# Patient Record
Sex: MALE | Race: WHITE | Employment: OTHER | URBAN - METROPOLITAN AREA
[De-identification: names, ages, dates, MRNs, and addresses within clinical notes are randomized per-mention and may not be internally consistent; named-entity substitution may affect disease eponyms.]

---

## 2017-05-09 ENCOUNTER — HOSPITAL ENCOUNTER (INPATIENT)
Age: 77
LOS: 6 days | Discharge: HOME | DRG: 252 | End: 2017-05-16
Attending: EMERGENCY MEDICINE | Admitting: FAMILY MEDICINE
Payer: MEDICARE

## 2017-05-09 DIAGNOSIS — I87.1: ICD-10-CM

## 2017-05-09 DIAGNOSIS — R55 NEAR SYNCOPE: Primary | ICD-10-CM

## 2017-05-09 DIAGNOSIS — J18.9 CAP (COMMUNITY ACQUIRED PNEUMONIA): ICD-10-CM

## 2017-05-09 PROCEDURE — 99285 EMERGENCY DEPT VISIT HI MDM: CPT

## 2017-05-09 PROCEDURE — 96361 HYDRATE IV INFUSION ADD-ON: CPT

## 2017-05-09 PROCEDURE — 96365 THER/PROPH/DIAG IV INF INIT: CPT

## 2017-05-09 NOTE — IP AVS SNAPSHOT
[address] [address] 
[phone] Patient: Seema Marte MRN: [id number] CFM:[id number] You are allergic to the following Allergen Reactions Chantix (Varenicline) Other (comments) Effexor (Venlafaxine) Other (comments) Recent Documentation Height Weight BMI Smoking Status 1.88 m 82.1 kg 23.24 kg/m2 Never Assessed Emergency Contacts Name Discharge Info Relation Home Work Mobile Karen Tamez CAREGIVER [4] Other Relative [6]   [phone] About your hospitalization You were admitted on:  May 10, 2017 You last received care in the:  [facility] [address] You were discharged on:  May 16, 2017 Unit phone number:  [phone] Why you were hospitalized Your primary diagnosis was:  Not on File Your diagnoses also included:  Dizziness, Pneumonia, Svc (Superior Vena Cava Obstruction), Khari (Acute Kidney Injury) ([facility]) Providers Seen During Your Hospitalizations Provider Role Specialty Primary office phone Maria Ines Calle MD Attending Provider Emergency Medicine [phone] Triny Medina MD Attending Provider [facility] [phone] Jeffery Lewis MD Attending Provider [facility] [phone] Iris Crystal DO Attending Provider Internal Medicine [phone] Patrick Galdamez DO Attending Provider Internal Medicine [phone] Your Primary Care Physician (PCP) Primary Care Physician Office Phone Office Fax Delmy Salome [phone] Follow-up Information Follow up With Details Comments Contact Info Aruna Esquivel MD In 1 week  [address] [place] [address] [phone] Iwona Dyson MD On 5/25/2017  [address] [address] [phone] 
[phone] Current Discharge Medication List  
  
 START taking these medications Dose & Instructions Dispensing Information Comments Morning Noon Evening Bedtime  
 apixaban 5 mg tablet Commonly known as:  Yessenia Cork Your last dose was: Your next dose is: Take two tabs two times daily for 7 days and then 1 tab two times daily  Indications: deep venous thrombosis Quantity:  74 Tab Refills:  0 CONTINUE these medications which have NOT CHANGED Dose & Instructions Dispensing Information Comments Morning Noon Evening Bedtime  
 albuterol 90 mcg/actuation inhaler Commonly known as:  PROVENTIL HFA, VENTOLIN HFA, PROAIR HFA Your last dose was: Your next dose is: Take  by inhalation. Refills:  0  
     
   
   
   
  
 alendronate 35 mg tablet Commonly known as:  FOSAMAX Your last dose was: Your next dose is:    
   
   
 Dose:  35 mg Take 35 mg by mouth every seven (7) days. Refills:  0  
     
   
   
   
  
 ALTACE 10 mg capsule Generic drug:  ramipril Your last dose was: Your next dose is:    
   
   
 Dose:  10 mg Take 10 mg by mouth daily. Refills:  0  
     
   
   
   
  
 aspirin 81 mg chewable tablet Your last dose was: Your next dose is:    
   
   
 Dose:  81 mg Take 81 mg by mouth daily. Refills:  0  
     
   
   
   
  
 atorvastatin 80 mg tablet Commonly known as:  LIPITOR Your last dose was: Your next dose is:    
   
   
 Dose:  80 mg Take 80 mg by mouth daily. Refills:  0 LEVITRA 20 mg tablet Generic drug:  vardenafil Your last dose was: Your next dose is:    
   
   
 Dose:  5 mg Take 5 mg by mouth as needed. Refills:  0 LEXAPRO 20 mg tablet Generic drug:  escitalopram oxalate Your last dose was: Your next dose is:    
   
   
 Dose:  20 mg Take 20 mg by mouth daily. Refills:  0 MIRALAX 17 gram packet Generic drug:  polyethylene glycol Your last dose was: Your next dose is:    
   
   
 Dose:  17 g Take 17 g by mouth daily. Refills:  0  
     
   
   
   
  
 multivitamin tablet Commonly known as:  ONE A DAY Your last dose was: Your next dose is:    
   
   
 Dose:  1 Tab Take 1 Tab by mouth daily. Refills:  0  
     
   
   
   
  
 sildenafil citrate 100 mg tablet Commonly known as:  VIAGRA Your last dose was: Your next dose is:    
   
   
 Dose:  100 mg Take 1 Tab by mouth daily as needed for 4 doses. Quantity:  5 Tab Refills:  12  
     
   
   
   
  
 tamoxifen 20 mg tablet Commonly known as:  NOLVADEX Your last dose was: Your next dose is:    
   
   
 Dose:  20 mg Take 20 mg by mouth daily. Refills:  0 STOP taking these medications   
 topiramate 50 mg tablet Commonly known as:  TOPAMAX Where to Get Your Medications These medications were sent to [address] [facility] [address] [facility] [address] [facility] [address] [facility] [address] Phone:  [phone]  
  apixaban 5 mg tablet Discharge Instructions DISCHARGE SUMMARY from Nurse The following personal items are in your possession at time of discharge: 
 
Dental Appliances: Uppers, Partials Visual Aid: None Home Medications: None Jewelry: None Clothing: Jacket/Coat, Pants, Footwear, At bedside, Socks Other Valuables: None PATIENT INSTRUCTIONS: 
 
 
F-face looks uneven A-arms unable to move or move unevenly S-speech slurred or non-existent T-time-call 911 as soon as signs and symptoms begin-DO NOT go Back to bed or wait to see if you get better-TIME IS BRAIN. Warning Signs of HEART ATTACK Call 911 if you have these symptoms: 
? Chest discomfort. Most heart attacks involve discomfort in the center of the chest that lasts more than a few minutes, or that goes away and comes back. It can feel like uncomfortable pressure, squeezing, fullness, or pain. ? Discomfort in other areas of the upper body. Symptoms can include pain or discomfort in one or both arms, the back, neck, jaw, or stomach. ? Shortness of breath with or without chest discomfort. ? Other signs may include breaking out in a cold sweat, nausea, or lightheadedness. Don't wait more than five minutes to call [address]! Fast action can save your life. Calling 911 is almost always the fastest way to get lifesaving treatment. Emergency Medical Services staff can begin treatment when they arrive  up to an hour sooner than if someone gets to the hospital by car. The discharge information has been reviewed with the patient. The patient verbalized understanding. Discharge medications reviewed with the patient and appropriate educational materials and side effects teaching were provided. [employer] Activation Thank you for enrolling in [facility] [address] [facility]. Please follow the instructions below to securely access your online medical record. VGBio allows you to send messages to your doctor, view your test results, renew your prescriptions, schedule appointments, and more. How Do I Sign Up? 1. In your internet browser, go to [email or web address]. 2. Click on the First Time User? Click Here link in the Sign In box. You will see the New Member Sign Up page. 3. Enter your V[employer]io Access Code exactly as it appears below. You will not need to use this code after youve completed the sign-up process.  If you do not sign up before the expiration date, you must request a new code. [employer] Expert Access Code: [email or web address] Expires: 8/14/2017  1:23 PM  
 
4. Enter the last four digits of your Social Security Number (xxxx) and Date of Birth (mm/dd/yyyy) as indicated and click Submit. You will be taken to the next sign-up page. 5. Create a Dynamics Expert ID. This will be your [employer] Expert login ID and cannot be changed, so think of one that is secure and easy to remember. 6. Create a [employer] Expert password. You can change your password at any time. 7. Enter your Password Reset Question and Answer. This can be used at a later time if you forget your password. 8. Enter your e-mail address. You will receive e-mail notification when new information is available in [address] 9. Click Sign Up. You can now view your medical record. Additional Information Remember, [employer] Expert is NOT to be used for urgent needs. For medical emergencies, dial 911. Now available from your iPhone and Android! Patient armband removed and shredded Discharge Instructions Attachments/References DVT (DEEP VEIN THROMBOSIS) (ENGLISH) Discharge Orders None Introducing [facility] SERVICES! Chelsea Tiwari introduces Dynamics Expert patient portal. Now you can access parts of your medical record, email your doctor's office, and request medication refills online. 1. In your internet browser, go to [email or web address] 2. Click on the First Time User? Click Here link in the Sign In box. You will see the New Member Sign Up page. 3. Enter your [employer] Expert Access Code exactly as it appears below. You will not need to use this code after youve completed the sign-up process. If you do not sign up before the expiration date, you must request a new code. · [employer] Access Code: [email or web address] Expires: 8/14/2017  1:23 PM 
 
4.  Enter the last four digits of your Social Security Number (xxxx) and Date of Birth (mm/dd/yyyy) as indicated and click Submit. You will be taken to the next sign-up page. 5. Create a RightSignature ID. This will be your RightSignature login ID and cannot be changed, so think of one that is secure and easy to remember. 6. Create a RightSignature password. You can change your password at any time. 7. Enter your Password Reset Question and Answer. This can be used at a later time if you forget your password. 8. Enter your e-mail address. You will receive e-mail notification when new information is available in [address] 9. Click Sign Up. You can now view and download portions of your medical record. 10. Click the Download Summary menu link to download a portable copy of your medical information. If you have questions, please visit the Frequently Asked Questions section of the RightSignature website. Remember, RightSignature is NOT to be used for urgent needs. For medical emergencies, dial 911. Now available from your iPhone and Android! General Information Please provide this summary of care documentation to your next provider. Patient Signature:  ____________________________________________________________ Date:  ____________________________________________________________  
  
Gavino Cons Provider Signature:  ____________________________________________________________ Date:  ____________________________________________________________ More Information Deep Vein Thrombosis: Care Instructions Your Care Instructions A deep vein thrombosis (DVT) is a blood clot in certain veins of the legs, pelvis, or arms. Blood clots in these veins need to be treated because they can get bigger, break loose, and travel through the bloodstream to the lungs. A blood clot in a lung can be life-threatening. The doctor may have given you a blood thinner (anticoagulant).  A blood thinner can stop the blood clot from growing larger and prevent new clots from forming. You will need to take a blood thinner for 3 to 6 months or longer. The doctor has checked you carefully, but problems can develop later. If you notice any problems or new symptoms, get medical treatment right away. Follow-up care is a key part of your treatment and safety. Be sure to make and go to all appointments, and call your doctor if you are having problems. It's also a good idea to know your test results and keep a list of the medicines you take. How can you care for yourself at home? · Take your medicines exactly as prescribed. Call your doctor if you think you are having a problem with your medicine. · If you are taking a blood thinner, be sure you get instructions about how to take your medicine safely. Blood thinners can cause serious bleeding problems. · Wear compression stockings if your doctor recommends them. These stockings are tighter at the feet than on the legs. They may reduce pain and swelling in your legs. You can get them with a prescription at a medical supply store or at some drugstores. · When you sit, use a pillow to raise the arm or leg that has the blood clot. Try to keep it above the level of your heart. When should you call for help? Call 911 anytime you think you may need emergency care. For example, call if: 
· You passed out (lost consciousness). · You have symptoms of a blood clot in your lung (called a pulmonary embolism). These include: 
¨ Sudden chest pain. ¨ Trouble breathing. ¨ Coughing up blood. Call your doctor now or seek immediate medical care if: 
· You have new or worse trouble breathing. · You are dizzy or lightheaded, or you feel like you may faint. · You have symptoms of a blood clot in your arm or leg. These may include: 
¨ Pain in the arm, calf, back of the knee, thigh, or groin. ¨ Redness and swelling in the arm, leg, or groin. Watch closely for changes in your health, and be sure to contact your doctor if: · You do not get better as expected. Where can you learn more? Go to http://miki-tavia.info/. Enter F432 in the search box to learn more about \"Deep Vein Thrombosis: Care Instructions. \" Current as of: October 13, 2016 Content Version: 11.2 © 8326-9867 [employer]. Care instructions adapted under license by [employer] (which disclaims liability or warranty for this information). If you have questions about a medical condition or this instruction, always ask your healthcare professional. [address] any warranty or liability for your use of this information.

## 2017-05-10 ENCOUNTER — APPOINTMENT (OUTPATIENT)
Dept: GENERAL RADIOLOGY | Age: 77
DRG: 252 | End: 2017-05-10
Attending: EMERGENCY MEDICINE
Payer: MEDICARE

## 2017-05-10 ENCOUNTER — APPOINTMENT (OUTPATIENT)
Dept: CT IMAGING | Age: 77
DRG: 252 | End: 2017-05-10
Attending: EMERGENCY MEDICINE
Payer: MEDICARE

## 2017-05-10 PROBLEM — J18.9 PNEUMONIA: Status: ACTIVE | Noted: 2017-05-10

## 2017-05-10 PROBLEM — I87.1 SVC (SUPERIOR VENA CAVA OBSTRUCTION): Status: ACTIVE | Noted: 2017-05-10

## 2017-05-10 PROBLEM — N17.9 AKI (ACUTE KIDNEY INJURY) (HCC): Status: ACTIVE | Noted: 2017-05-10

## 2017-05-10 PROBLEM — R42 DIZZINESS: Status: ACTIVE | Noted: 2017-05-10

## 2017-05-10 LAB
ALBUMIN SERPL BCP-MCNC: 3.5 G/DL (ref 3.4–5)
ALBUMIN/GLOB SERPL: 1.1 {RATIO} (ref 0.8–1.7)
ALP SERPL-CCNC: 57 U/L (ref 45–117)
ALT SERPL-CCNC: 41 U/L (ref 16–61)
ANION GAP BLD CALC-SCNC: 6 MMOL/L (ref 3–18)
ANION GAP BLD CALC-SCNC: 8 MMOL/L (ref 3–18)
APTT PPP: 144.3 SEC (ref 23–36.4)
APTT PPP: 26.8 SEC (ref 23–36.4)
APTT PPP: 57.1 SEC (ref 23–36.4)
APTT PPP: >180 SEC (ref 23–36.4)
AST SERPL W P-5'-P-CCNC: 41 U/L (ref 15–37)
ATRIAL RATE: 79 BPM
BASOPHILS # BLD AUTO: 0 K/UL (ref 0–0.06)
BASOPHILS # BLD: 0 % (ref 0–2)
BILIRUB SERPL-MCNC: 0.5 MG/DL (ref 0.2–1)
BUN SERPL-MCNC: 25 MG/DL (ref 7–18)
BUN SERPL-MCNC: 28 MG/DL (ref 7–18)
BUN/CREAT SERPL: 20 (ref 12–20)
BUN/CREAT SERPL: 20 (ref 12–20)
CALCIUM SERPL-MCNC: 8 MG/DL (ref 8.5–10.1)
CALCIUM SERPL-MCNC: 8.4 MG/DL (ref 8.5–10.1)
CALCULATED P AXIS, ECG09: 76 DEGREES
CALCULATED R AXIS, ECG10: 19 DEGREES
CALCULATED T AXIS, ECG11: 104 DEGREES
CHLORIDE SERPL-SCNC: 108 MMOL/L (ref 100–108)
CHLORIDE SERPL-SCNC: 110 MMOL/L (ref 100–108)
CO2 SERPL-SCNC: 23 MMOL/L (ref 21–32)
CO2 SERPL-SCNC: 27 MMOL/L (ref 21–32)
CREAT SERPL-MCNC: 1.25 MG/DL (ref 0.6–1.3)
CREAT SERPL-MCNC: 1.42 MG/DL (ref 0.6–1.3)
D DIMER PPP FEU-MCNC: 2.21 UG/ML(FEU)
DIAGNOSIS, 93000: NORMAL
DIFFERENTIAL METHOD BLD: ABNORMAL
EOSINOPHIL # BLD: 0.1 K/UL (ref 0–0.4)
EOSINOPHIL NFR BLD: 1 % (ref 0–5)
ERYTHROCYTE [DISTWIDTH] IN BLOOD BY AUTOMATED COUNT: 14.5 % (ref 11.6–14.5)
GLOBULIN SER CALC-MCNC: 3.1 G/DL (ref 2–4)
GLUCOSE BLD STRIP.AUTO-MCNC: 87 MG/DL (ref 70–110)
GLUCOSE BLD STRIP.AUTO-MCNC: 95 MG/DL (ref 70–110)
GLUCOSE SERPL-MCNC: 103 MG/DL (ref 74–99)
GLUCOSE SERPL-MCNC: 129 MG/DL (ref 74–99)
HCT VFR BLD AUTO: 42.5 % (ref 36–48)
HGB BLD-MCNC: 14.2 G/DL (ref 13–16)
LYMPHOCYTES # BLD AUTO: 13 % (ref 21–52)
LYMPHOCYTES # BLD: 1.6 K/UL (ref 0.9–3.6)
MCH RBC QN AUTO: 29.7 PG (ref 24–34)
MCHC RBC AUTO-ENTMCNC: 33.4 G/DL (ref 31–37)
MCV RBC AUTO: 88.9 FL (ref 74–97)
MONOCYTES # BLD: 1.1 K/UL (ref 0.05–1.2)
MONOCYTES NFR BLD AUTO: 9 % (ref 3–10)
NEUTS SEG # BLD: 9.5 K/UL (ref 1.8–8)
NEUTS SEG NFR BLD AUTO: 77 % (ref 40–73)
P-R INTERVAL, ECG05: 150 MS
PLATELET # BLD AUTO: 237 K/UL (ref 135–420)
PMV BLD AUTO: 10.4 FL (ref 9.2–11.8)
POTASSIUM SERPL-SCNC: 4.2 MMOL/L (ref 3.5–5.5)
POTASSIUM SERPL-SCNC: 4.4 MMOL/L (ref 3.5–5.5)
PROT SERPL-MCNC: 6.6 G/DL (ref 6.4–8.2)
Q-T INTERVAL, ECG07: 392 MS
QRS DURATION, ECG06: 78 MS
QTC CALCULATION (BEZET), ECG08: 449 MS
RBC # BLD AUTO: 4.78 M/UL (ref 4.7–5.5)
SODIUM SERPL-SCNC: 141 MMOL/L (ref 136–145)
SODIUM SERPL-SCNC: 141 MMOL/L (ref 136–145)
TROPONIN I SERPL-MCNC: <0.02 NG/ML (ref 0–0.04)
VENTRICULAR RATE, ECG03: 79 BPM
WBC # BLD AUTO: 12.3 K/UL (ref 4.6–13.2)

## 2017-05-10 PROCEDURE — 74011636320 HC RX REV CODE- 636/320: Performed by: EMERGENCY MEDICINE

## 2017-05-10 PROCEDURE — 65660000000 HC RM CCU STEPDOWN

## 2017-05-10 PROCEDURE — 85025 COMPLETE CBC W/AUTO DIFF WBC: CPT | Performed by: EMERGENCY MEDICINE

## 2017-05-10 PROCEDURE — 74011250637 HC RX REV CODE- 250/637: Performed by: EMERGENCY MEDICINE

## 2017-05-10 PROCEDURE — 77030003560 HC NDL HUBR BARD -A

## 2017-05-10 PROCEDURE — 85379 FIBRIN DEGRADATION QUANT: CPT | Performed by: EMERGENCY MEDICINE

## 2017-05-10 PROCEDURE — 71010 XR CHEST PORT: CPT

## 2017-05-10 PROCEDURE — 87040 BLOOD CULTURE FOR BACTERIA: CPT | Performed by: EMERGENCY MEDICINE

## 2017-05-10 PROCEDURE — 84484 ASSAY OF TROPONIN QUANT: CPT | Performed by: EMERGENCY MEDICINE

## 2017-05-10 PROCEDURE — 93970 EXTREMITY STUDY: CPT

## 2017-05-10 PROCEDURE — 85730 THROMBOPLASTIN TIME PARTIAL: CPT | Performed by: EMERGENCY MEDICINE

## 2017-05-10 PROCEDURE — 74011250636 HC RX REV CODE- 250/636: Performed by: FAMILY MEDICINE

## 2017-05-10 PROCEDURE — 80048 BASIC METABOLIC PNL TOTAL CA: CPT | Performed by: FAMILY MEDICINE

## 2017-05-10 PROCEDURE — 36415 COLL VENOUS BLD VENIPUNCTURE: CPT | Performed by: FAMILY MEDICINE

## 2017-05-10 PROCEDURE — 74011250636 HC RX REV CODE- 250/636: Performed by: EMERGENCY MEDICINE

## 2017-05-10 PROCEDURE — 77030020263 HC SOL INJ SOD CL0.9% LFCR 1000ML

## 2017-05-10 PROCEDURE — A6209 FOAM DRSG <=16 SQ IN W/O BDR: HCPCS

## 2017-05-10 PROCEDURE — 82962 GLUCOSE BLOOD TEST: CPT

## 2017-05-10 PROCEDURE — 71275 CT ANGIOGRAPHY CHEST: CPT

## 2017-05-10 PROCEDURE — 80053 COMPREHEN METABOLIC PANEL: CPT | Performed by: EMERGENCY MEDICINE

## 2017-05-10 PROCEDURE — 74011250636 HC RX REV CODE- 250/636: Performed by: HOSPITALIST

## 2017-05-10 PROCEDURE — 74011250637 HC RX REV CODE- 250/637: Performed by: FAMILY MEDICINE

## 2017-05-10 PROCEDURE — 93005 ELECTROCARDIOGRAM TRACING: CPT

## 2017-05-10 PROCEDURE — 74011000258 HC RX REV CODE- 258: Performed by: EMERGENCY MEDICINE

## 2017-05-10 RX ORDER — TAMOXIFEN CITRATE 10 MG/1
20 TABLET, FILM COATED ORAL DAILY
Status: DISCONTINUED | OUTPATIENT
Start: 2017-05-10 | End: 2017-05-10

## 2017-05-10 RX ORDER — VARDENAFIL HYDROCHLORIDE 20 MG/1
5 TABLET ORAL AS NEEDED
COMMUNITY

## 2017-05-10 RX ORDER — SODIUM CHLORIDE 9 MG/ML
100 INJECTION, SOLUTION INTRAVENOUS CONTINUOUS
Status: DISCONTINUED | OUTPATIENT
Start: 2017-05-10 | End: 2017-05-14

## 2017-05-10 RX ORDER — BISMUTH SUBSALICYLATE 262 MG
1 TABLET,CHEWABLE ORAL DAILY
COMMUNITY

## 2017-05-10 RX ORDER — ALENDRONATE SODIUM 35 MG/1
35 TABLET ORAL
COMMUNITY

## 2017-05-10 RX ORDER — ALENDRONATE SODIUM 35 MG/1
35 TABLET ORAL
Status: DISCONTINUED | OUTPATIENT
Start: 2017-05-12 | End: 2017-05-16 | Stop reason: HOSPADM

## 2017-05-10 RX ORDER — RAMIPRIL 10 MG/1
10 CAPSULE ORAL DAILY
COMMUNITY

## 2017-05-10 RX ORDER — ESCITALOPRAM OXALATE 20 MG/1
20 TABLET ORAL DAILY
COMMUNITY

## 2017-05-10 RX ORDER — ALBUTEROL SULFATE 90 UG/1
AEROSOL, METERED RESPIRATORY (INHALATION)
COMMUNITY

## 2017-05-10 RX ORDER — SODIUM CHLORIDE 9 MG/ML
100 INJECTION, SOLUTION INTRAVENOUS
Status: COMPLETED | OUTPATIENT
Start: 2017-05-10 | End: 2017-05-10

## 2017-05-10 RX ORDER — BISMUTH SUBSALICYLATE 262 MG
1 TABLET,CHEWABLE ORAL DAILY
Status: DISCONTINUED | OUTPATIENT
Start: 2017-05-10 | End: 2017-05-16 | Stop reason: HOSPADM

## 2017-05-10 RX ORDER — GUAIFENESIN 100 MG/5ML
81 LIQUID (ML) ORAL DAILY
Status: DISCONTINUED | OUTPATIENT
Start: 2017-05-10 | End: 2017-05-11

## 2017-05-10 RX ORDER — HEPARIN SODIUM 1000 [USP'U]/ML
3228 INJECTION, SOLUTION INTRAVENOUS; SUBCUTANEOUS ONCE
Status: COMPLETED | OUTPATIENT
Start: 2017-05-10 | End: 2017-05-10

## 2017-05-10 RX ORDER — TOPIRAMATE 50 MG/1
150 TABLET, FILM COATED ORAL
COMMUNITY
End: 2017-05-16

## 2017-05-10 RX ORDER — POLYETHYLENE GLYCOL 3350 17 G/17G
17 POWDER, FOR SOLUTION ORAL DAILY
COMMUNITY

## 2017-05-10 RX ORDER — SODIUM CHLORIDE 9 MG/ML
125 INJECTION, SOLUTION INTRAVENOUS CONTINUOUS
Status: DISCONTINUED | OUTPATIENT
Start: 2017-05-10 | End: 2017-05-14

## 2017-05-10 RX ORDER — HEPARIN SODIUM 1000 [USP'U]/ML
80 INJECTION, SOLUTION INTRAVENOUS; SUBCUTANEOUS ONCE
Status: COMPLETED | OUTPATIENT
Start: 2017-05-10 | End: 2017-05-10

## 2017-05-10 RX ORDER — ATORVASTATIN CALCIUM 40 MG/1
80 TABLET, FILM COATED ORAL DAILY
Status: DISCONTINUED | OUTPATIENT
Start: 2017-05-10 | End: 2017-05-16 | Stop reason: HOSPADM

## 2017-05-10 RX ORDER — TAMOXIFEN CITRATE 10 MG/1
20 TABLET, FILM COATED ORAL DAILY
Status: DISCONTINUED | OUTPATIENT
Start: 2017-05-10 | End: 2017-05-16 | Stop reason: HOSPADM

## 2017-05-10 RX ORDER — ACETAMINOPHEN 500 MG
1000 TABLET ORAL
Status: COMPLETED | OUTPATIENT
Start: 2017-05-10 | End: 2017-05-10

## 2017-05-10 RX ORDER — ALENDRONATE SODIUM 35 MG/1
35 TABLET ORAL
Status: DISCONTINUED | OUTPATIENT
Start: 2017-05-12 | End: 2017-05-10

## 2017-05-10 RX ORDER — GUAIFENESIN 100 MG/5ML
81 LIQUID (ML) ORAL DAILY
COMMUNITY

## 2017-05-10 RX ORDER — ESCITALOPRAM OXALATE 10 MG/1
20 TABLET ORAL DAILY
Status: DISCONTINUED | OUTPATIENT
Start: 2017-05-10 | End: 2017-05-16 | Stop reason: HOSPADM

## 2017-05-10 RX ORDER — RAMIPRIL 5 MG/1
10 CAPSULE ORAL DAILY
Status: DISCONTINUED | OUTPATIENT
Start: 2017-05-10 | End: 2017-05-16 | Stop reason: HOSPADM

## 2017-05-10 RX ORDER — TAMOXIFEN CITRATE 20 MG/1
20 TABLET ORAL DAILY
COMMUNITY

## 2017-05-10 RX ORDER — HEPARIN SODIUM 10000 [USP'U]/100ML
18-36 INJECTION, SOLUTION INTRAVENOUS
Status: DISCONTINUED | OUTPATIENT
Start: 2017-05-10 | End: 2017-05-12

## 2017-05-10 RX ORDER — ATORVASTATIN CALCIUM 80 MG/1
80 TABLET, FILM COATED ORAL DAILY
COMMUNITY

## 2017-05-10 RX ADMIN — HEPARIN SODIUM 3228 UNITS: 1000 INJECTION, SOLUTION INTRAVENOUS; SUBCUTANEOUS at 19:08

## 2017-05-10 RX ADMIN — SODIUM CHLORIDE 100 ML/HR: 900 INJECTION, SOLUTION INTRAVENOUS at 14:23

## 2017-05-10 RX ADMIN — HEPARIN SODIUM AND DEXTROSE 18 UNITS/KG/HR: 10000; 5 INJECTION INTRAVENOUS at 05:06

## 2017-05-10 RX ADMIN — ASPIRIN 81 MG CHEWABLE TABLET 81 MG: 81 TABLET CHEWABLE at 09:13

## 2017-05-10 RX ADMIN — SODIUM CHLORIDE 1000 ML: 900 INJECTION, SOLUTION INTRAVENOUS at 00:34

## 2017-05-10 RX ADMIN — SODIUM CHLORIDE 100 ML/HR: 900 INJECTION, SOLUTION INTRAVENOUS at 06:30

## 2017-05-10 RX ADMIN — SODIUM CHLORIDE 100 ML/HR: 900 INJECTION, SOLUTION INTRAVENOUS at 23:36

## 2017-05-10 RX ADMIN — IOPAMIDOL 75 ML: 755 INJECTION, SOLUTION INTRAVENOUS at 02:07

## 2017-05-10 RX ADMIN — MULTIVITAMIN TABLET 1 TABLET: TABLET at 09:13

## 2017-05-10 RX ADMIN — TAMOXIFEN CITRATE 20 MG: 10 TABLET ORAL at 12:40

## 2017-05-10 RX ADMIN — SODIUM CHLORIDE 1000 ML: 900 INJECTION, SOLUTION INTRAVENOUS at 02:20

## 2017-05-10 RX ADMIN — ATORVASTATIN CALCIUM 80 MG: 40 TABLET, FILM COATED ORAL at 09:13

## 2017-05-10 RX ADMIN — SODIUM CHLORIDE 500 MG: 900 INJECTION, SOLUTION INTRAVENOUS at 05:04

## 2017-05-10 RX ADMIN — ACETAMINOPHEN 1000 MG: 500 TABLET ORAL at 03:55

## 2017-05-10 RX ADMIN — RAMIPRIL 10 MG: 5 CAPSULE ORAL at 12:40

## 2017-05-10 RX ADMIN — HEPARIN SODIUM 6460 UNITS: 1000 INJECTION, SOLUTION INTRAVENOUS; SUBCUTANEOUS at 05:04

## 2017-05-10 RX ADMIN — SODIUM CHLORIDE 100 ML: 900 INJECTION, SOLUTION INTRAVENOUS at 02:08

## 2017-05-10 RX ADMIN — SODIUM CHLORIDE 125 ML/HR: 900 INJECTION, SOLUTION INTRAVENOUS at 03:55

## 2017-05-10 RX ADMIN — CEFTRIAXONE 2 G: 2 INJECTION, POWDER, FOR SOLUTION INTRAMUSCULAR; INTRAVENOUS at 03:55

## 2017-05-10 RX ADMIN — ESCITALOPRAM OXALATE 20 MG: 10 TABLET ORAL at 09:13

## 2017-05-10 NOTE — ED PROVIDER NOTES
HPI Comments: Bren Saleem is a 76 y.o. Male with c/o sensation near syncope for last day. Seen at [facility] for same, labs done with no sig abnl. Felt ok when he went home then sx started again especially with standing. No cp, sob, abd pain, nvd, headache, numbness, tingling. H/o cabg and breast cancer in past. No recent peterson, chf. Sx better with lying down. The history is provided by the patient and medical records. Past Medical History:   Diagnosis Date    Atherosclerosis of abdominal aorta (HCC)     CAD, multiple vessel     Essential hypertension, benign     High cholesterol     Hypercholesteremia     Hypertension     [facility] depressive disorder, recurrent episode ([facility] [address]     Malignant neoplasm of nipple of right male breast ([facility] [address]     Osteopenia     Prostate cancer ([facility] [address]     Renal stones     Vitamin D deficiency        Past Surgical History:   Procedure Laterality Date    HX CORONARY ARTERY BYPASS GRAFT           History reviewed. No pertinent family history. Social History     Social History    Marital status: SINGLE     Spouse name: N/A    Number of children: N/A    Years of education: N/A     Occupational History    Not on file. Social History Main Topics    Smoking status: Not on file    Smokeless tobacco: Not on file    Alcohol use Not on file    Drug use: Not on file    Sexual activity: Not on file     Other Topics Concern    Not on file     Social History Narrative    No narrative on file         ALLERGIES: Chantix [varenicline] and Effexor [venlafaxine]    Review of Systems   Constitutional: Negative for fever. HENT: Negative for sore throat. Eyes: Negative for visual disturbance. Respiratory: Negative for cough and shortness of breath. Cardiovascular: Negative for chest pain and leg swelling. Gastrointestinal: Negative for abdominal pain. Endocrine: Negative for polyuria. Genitourinary: Negative for difficulty urinating and hematuria. Musculoskeletal: Negative for gait problem. Skin: Positive for color change (noted bluish tint to skin upper chest face). Negative for rash. Allergic/Immunologic: Negative for immunocompromised state. Neurological: Positive for light-headedness. Negative for syncope and headaches. Hematological: Does not bruise/bleed easily. Psychiatric/Behavioral: Negative for confusion. Vitals:    05/10/17 0220 05/10/17 0245 05/10/17 0300 05/10/17 0315   BP:  138/75 146/71 140/81   Pulse: 74 71 72 81   Resp:  16 19    Temp:       SpO2: 94% 96% 96% 97%   Weight:       Height:                Physical Exam   Constitutional: He is oriented to person, place, and time. Non-toxic appearance. He does not appear ill. No distress. HENT:   Head: Normocephalic and atraumatic. Right Ear: External ear normal.   Left Ear: External ear normal.   Nose: Nose normal.   Mouth/Throat: Oropharynx is clear and moist. No oropharyngeal exudate. Eyes: Conjunctivae are normal.   Neck: Normal range of motion. Cardiovascular: Normal rate, regular rhythm, normal heart sounds and intact distal pulses. Pulses:       Radial pulses are 2+ on the right side, and 2+ on the left side. Femoral pulses are 2+ on the right side, and 2+ on the left side. Dorsalis pedis pulses are 1+ on the right side, and 1+ on the left side. Pulmonary/Chest: Effort normal and breath sounds normal. No respiratory distress. Abdominal: Soft. There is no tenderness. Musculoskeletal: Normal range of motion. He exhibits no edema. Neurological: He is alert and oriented to person, place, and time. Skin: Skin is warm and dry. He is not diaphoretic. Cyanotic color to upper chest, face     Psychiatric: His behavior is normal.   Nursing note and vitals reviewed.        [facility]  ED Course       Procedures    Vitals:  Patient Vitals for the past 12 hrs:   Temp Pulse Resp BP SpO2   05/10/17 0315 - 81 - 140/81 97 %   05/10/17 0300 - 72 19 146/71 96 % 05/10/17 0245 - 71 16 138/75 96 %   05/10/17 0220 - 74 - - 94 %   05/10/17 0216 - - - 130/70 95 %   05/10/17 0038 - 93 - 93/67 -   05/10/17 0038 - 87 - 106/71 -   05/10/17 0037 - 78 - 118/79 -   05/10/17 0037 - 79 - 112/69 -   05/10/17 0032 - 79 20 118/79 97 %   05/09/17 2357 96.3 °F (35.7 °C) - - - -   05/09/17 2355 - 85 16 119/83 96 %         Medications ordered:   Medications   acetaminophen (TYLENOL) tablet 1,000 mg (not administered)   heparin (porcine) 1,000 unit/mL injection 6,460 Units (not administered)   heparin 25,000 units in D5W 250 ml infusion (not administered)   0.9% sodium chloride infusion (not administered)   cefTRIAXone (ROCEPHIN) 2 g in 0.9% sodium chloride (MBP/ADV) 50 mL MBP (not administered)   azithromycin (ZITHROMAX) 500 mg in 0.9% sodium chloride (MBP/ADV) 250 mL adv (not administered)   sodium chloride 0.9 % bolus infusion 1,000 mL (0 mL IntraVENous IV Completed 5/10/17 0334)   sodium chloride 0.9 % bolus infusion 1,000 mL (0 mL IntraVENous IV Completed 5/10/17 0334)   iopamidol (ISOVUE-370) 76 % injection 75 mL (75 mL IntraVENous Given 5/10/17 0207)   0.9% sodium chloride infusion 100 mL (100 mL IntraVENous Push 5/10/17 0208)         Lab findings:  Recent Results (from the past 12 hour(s))   EKG, 12 LEAD, INITIAL    Collection Time: 05/10/17 12:26 AM   Result Value Ref Range    Ventricular Rate 79 BPM    Atrial Rate 79 BPM    P-R Interval 150 ms    QRS Duration 78 ms    Q-T Interval 392 ms    QTC Calculation (Bezet) 449 ms    Calculated P Axis 76 degrees    Calculated R Axis 19 degrees    Calculated T Axis 104 degrees    Diagnosis       Normal sinus rhythm  ST & T wave abnormality, consider lateral ischemia  Abnormal ECG  No previous ECGs available     CBC WITH AUTOMATED DIFF    Collection Time: 05/10/17 12:30 AM   Result Value Ref Range    WBC 12.3 4.6 - 13.2 K/uL    RBC 4.78 4.70 - 5.50 M/uL    HGB 14.2 13.0 - 16.0 g/dL    HCT 42.5 36.0 - 48.0 %    MCV 88.9 74.0 - 97.0 FL    MCH 29.7 24.0 - 34.0 PG    MCHC 33.4 31.0 - 37.0 g/dL    RDW 14.5 11.6 - 14.5 %    PLATELET 335 113 - 252 K/uL    MPV 10.4 9.2 - 11.8 FL    NEUTROPHILS 77 (H) 40 - 73 %    LYMPHOCYTES 13 (L) 21 - 52 %    MONOCYTES 9 3 - 10 %    EOSINOPHILS 1 0 - 5 %    BASOPHILS 0 0 - 2 %    ABS. NEUTROPHILS 9.5 (H) 1.8 - 8.0 K/UL    ABS. LYMPHOCYTES 1.6 0.9 - 3.6 K/UL    ABS. MONOCYTES 1.1 0.05 - 1.2 K/UL    ABS. EOSINOPHILS 0.1 0.0 - 0.4 K/UL    ABS. BASOPHILS 0.0 0.0 - 0.06 K/UL    DF AUTOMATED     METABOLIC PANEL, COMPREHENSIVE    Collection Time: 05/10/17 12:30 AM   Result Value Ref Range    Sodium 141 136 - 145 mmol/L    Potassium 4.2 3.5 - 5.5 mmol/L    Chloride 108 100 - 108 mmol/L    CO2 27 21 - 32 mmol/L    Anion gap 6 3.0 - 18 mmol/L    Glucose 129 (H) 74 - 99 mg/dL    BUN 28 (H) 7.0 - 18 MG/DL    Creatinine 1.42 (H) 0.6 - 1.3 MG/DL    BUN/Creatinine ratio 20 12 - 20      GFR est AA 58 (L) >60 ml/min/1.73m2    GFR est non-AA 48 (L) >60 ml/min/1.73m2    Calcium 8.4 (L) 8.5 - 10.1 MG/DL    Bilirubin, total 0.5 0.2 - 1.0 MG/DL    ALT (SGPT) 41 16 - 61 U/L    AST (SGOT) 41 (H) 15 - 37 U/L    Alk. phosphatase 57 45 - 117 U/L    Protein, total 6.6 6.4 - 8.2 g/dL    Albumin 3.5 3.4 - 5.0 g/dL    Globulin 3.1 2.0 - 4.0 g/dL    A-G Ratio 1.1 0.8 - 1.7     TROPONIN I    Collection Time: 05/10/17 12:30 AM   Result Value Ref Range    Troponin-I, Qt. <0.02 0.0 - 0.045 NG/ML   D DIMER    Collection Time: 05/10/17 12:30 AM   Result Value Ref Range    D DIMER 2.21 (H) <0.46 ug/ml(FEU)       EKG interpretation by ED Physician:  nsr with ns st tw abnl.  No elevation  Rate 79, qtc 449    X-Ray, CT or other radiology findings or impressions:  XR CHEST PORT    (Results Pending)   CTA CHEST W OR W WO CONT    (Results Pending)   DUPLEX UPPER EXT VENOUS BILAT    (Results Pending)   cxr with nap  Ct with occluded svc new from [facility] study last year, also air space disease right upper lobe  Progress notes, Consult notes or additional Procedure notes:   Pt not in extremis. Will place on heparin. Dw/ dr Lore Carvajal on call for vascular who agrees with heparin and will later today  D/w dr Sydni Wade who will admit    Reevaluation of patient:   stable    Disposition:  Diagnosis:   1. Near syncope    2. SVCO (superior vena cava obstruction)    3. CAP (community acquired pneumonia)        Disposition: admit    Follow-up Information     None            Patient's Medications   Start Taking    No medications on file   Continue Taking    ALBUTEROL (PROVENTIL HFA, VENTOLIN HFA, PROAIR HFA) 90 MCG/ACTUATION INHALER    Take  by inhalation. ALENDRONATE (FOSAMAX) 35 MG TABLET    Take 35 mg by mouth every seven (7) days. ASPIRIN 81 MG CHEWABLE TABLET    Take 81 mg by mouth daily. ATORVASTATIN (LIPITOR) 80 MG TABLET    Take 80 mg by mouth daily. ESCITALOPRAM OXALATE (LEXAPRO) 20 MG TABLET    Take 20 mg by mouth daily. MULTIVITAMIN (ONE A DAY) TABLET    Take 1 Tab by mouth daily. POLYETHYLENE GLYCOL (MIRALAX) 17 GRAM PACKET    Take 17 g by mouth daily. RAMIPRIL (ALTACE) 10 MG CAPSULE    Take 10 mg by mouth daily. SILDENAFIL CITRATE (VIAGRA) 100 MG TABLET    Take 1 Tab by mouth daily as needed for 4 doses. TAMOXIFEN (NOLVADEX) 20 MG TABLET    Take 20 mg by mouth daily. TOPIRAMATE (TOPAMAX) 50 MG TABLET    Take 150 mg by mouth nightly. VARDENAFIL (LEVITRA) 20 MG TABLET    Take 5 mg by mouth as needed.    These Medications have changed    No medications on file   Stop Taking    No medications on file

## 2017-05-10 NOTE — PROGRESS NOTES
0700: assumed care of patient resting in bed, no s/s of distress noted. Patient continues on 4 liters NC. Denies pain at this time. Bedside report received from Ari Anna 11     0036: Heparin gtt on hold per protocol at this time as aPtt resulted 144.3 ( hold for 1 hour then decrease by 3units)    Dr. Rosangela Gasca at bedside, order given to advance diet to cardiac.     1428: heparin gtt resumed per orders, heparin gtt infusing at 15units, next aPtt due 1530. Spoke with Dr. Sanders Heading about transfer to floor until procedure, order given    [id number]: patient assigned room [phone]. TRANSFER - OUT REPORT:    Verbal report given to Tracey Meyer RN (name) on Janis Soulier  being transferred to [address] [place] [address] (unit) for routine progression of care       Report consisted of patients Situation, Background, Assessment and   Recommendations(SBAR). Information from the following report(s) SBAR, ED Summary, Intake/Output, Recent Results, Med Rec Status and Cardiac Rhythm NSR with PVS was reviewed with the receiving nurse. Lines:   Peripheral IV 05/10/17 Right Antecubital (Active)   Site Assessment Clean, dry, & intact 5/10/2017  7:00 PM   Phlebitis Assessment 0 5/10/2017  7:00 PM   Infiltration Assessment 0 5/10/2017  7:00 PM   Dressing Status Old drainage 5/10/2017  7:00 PM   Dressing Type Transparent 5/10/2017  4:00 PM   Hub Color/Line Status Green;Capped; Patent; Flushed 5/10/2017  4:00 PM   Action Taken Open ports on tubing capped 5/10/2017  4:00 PM   Alcohol Cap Used Yes 5/10/2017  4:00 PM       Peripheral IV 05/10/17 Left Antecubital (Active)   Site Assessment Clean, dry, & intact 5/10/2017  7:00 PM   Phlebitis Assessment 0 5/10/2017  7:00 PM   Infiltration Assessment 0 5/10/2017  7:00 PM   Dressing Status Old drainage 5/10/2017  7:00 PM   Dressing Type Transparent 5/10/2017  4:00 PM   Hub Color/Line Status Green; Infusing;Patent 5/10/2017  4:00 PM   Action Taken Open ports on tubing capped 5/10/2017  4:00 PM   Alcohol Cap Used Yes 5/10/2017  4:00 PM       Peripheral IV 05/10/17 Left Hand (Active)   Site Assessment Clean, dry, & intact 5/10/2017  7:00 PM   Phlebitis Assessment 0 5/10/2017  7:00 PM   Infiltration Assessment 0 5/10/2017  7:00 PM   Dressing Status Old drainage 5/10/2017  7:00 PM   Dressing Type Transparent 5/10/2017  4:00 PM   Hub Color/Line Status Pink; Infusing;Patent 5/10/2017  4:00 PM   Action Taken Open ports on tubing capped 5/10/2017  4:00 PM   Alcohol Cap Used Yes 5/10/2017  4:00 PM        Opportunity for questions and clarification was provided. Patient transported with:   O2 @ 4 liters  Registered Nurse     Patient waiting for dinner tray in room at this, requesting that he eat dinner and then be transferred to new room. 1753: heparin gtt increased per protocol ( increase of 2 units to 17 units and give bolus, order placed). 1950: patient transferred to room 3024. Bedside report given to Alex Bah RN.

## 2017-05-10 NOTE — ED TRIAGE NOTES
Patient reports dizziness x 2-3 hrs tonight. Previous episodes this AM. Reports being at [facility] this AM for same complaint.

## 2017-05-10 NOTE — INTERDISCIPLINARY ROUNDS
[facility] ICU AM Rounds    Pt observed & discussed on rounds. He is being treated for prostate cancer & breast cancer; has a mediport that is surrounded by occlusive thrombus in the SVC. No reported resp sx    Plain CXR = minimal subtle findings    CT reviewed:   some bronchiectasis and possible pneumonia right side; no old films. Big lymph node just above RMSB    RADIOLOGY IMPRESSION:   1. Nondiagnostic for pulmonary embolism. This is likely due to occluded SVC. Etiology of the thrombus is unknown and may be from the left Mediport or  malignant thrombus. 2. Significant low-density mediastinal and right hilar adenopathy concerning for  necrotic metastatic disease, infection including TB thought less likely but a  consideration. 3. Left axillary edema, likely from the occluded SVC. 4. Right upper lobe airspace disease concerning for pneumonia. Additional subtle  areas of tree-in-bud density in the right lower lobe and left upper  lobe/lingula, likely inflammatory/infectious. 5. Indeterminate left adrenal gland nodule. Tiny hepatic hypodensities. IMPRESSION:  On heparin drip & Vascular Surgery has been consulted. PLAN:  [facility] available as needed.  Please call for consult if needed.     -taty  [phone]

## 2017-05-10 NOTE — PROGRESS NOTES
Patient admitted early this AM by Dr Vijay Caro. Patient has thrombus in SVC. He reports that he feels better now on heparin and oxygen. Reaching out to Vascular Surgery.

## 2017-05-10 NOTE — ROUTINE PROCESS
0630 Pt arrived from the ED with PIVs X3, O2 at 4L via NC. Chlorahexadine bath given, VSS, no distress noted or reported. Pts color is reddish purple on face, neck and chest with any exertion. Color is improving.    0700 Bedside and Verbal shift change report given to Keyon Franco RN (oncoming nurse) by Cassandra Meeks RN (offgoing nurse). Report included the following information SBAR, Kardex, Intake/Output, MAR, Recent Results and Cardiac Rhythm NSR c PACs.

## 2017-05-10 NOTE — MANAGEMENT PLAN
[facility]   Discharge Planning/ Assessment    Reasons for Intervention:   Interviewed patient. Verified demographics listed on face sheet with patient; all information correct. Pt has [employer] for insurance. Patient stated their PCP is Dr Ruthie Terrell and last appt 2-3 weeks ago . Patient lives alone in private residence . Patient's NOK is sister, Bruna Cooper at [phone]. Patient independent with ADLs prior to admission. No use of DME prior to admission.  Discharge plan is [address] AL[address] BSN  Outcomes Manager  Pager # [phone]    High Risk Criteria  [] Yes  [x]No   Physician Referral  [] Yes  [x]No        Date    Nursing Referral  [] Yes  [x]No        Date    Patient/Family Request  [] Yes  [x]No        Date       Resources:    Medicare  [x] Yes  []No   Medicaid  [] Yes  [x]No   No Resources  [] Yes  [x]No   Private Insurance  [] Yes  [x]No    Name/Phone Number    Other  [] Yes  [x]No        (i.e. Workman's Comp)         Prior Services:    Prior Services  [] Yes  [x]No   Home Health  [] Yes  [x]No   [address] [place]  [] Yes  [x]No        Number of Hours    Home Care Program  [] Yes  [x]No       Meals on Wheels  [] Yes  [x]No   Office on Aging  [] Yes  [x]No   Transportation Services  [] Yes  [x]No   Nursing Home  [] Yes  [x]No        Nursing Home Name    [address]  [] Yes  [x]No        [address] Name    Other       Information Source:      Information obtained from  [x] Patient  [] Parent   [] [address]  [] Child  [] Spouse   [] Significant Other/Partner   [] Friend      [] EMS    [] Nursing Home Chart          [] Other:   Chart Review  [x] Yes  []No     Family/Support System:    Patient lives with  [x] Alone    [] Spouse   [] Significant Other  [] Children  [] Caretaker   [] Parent  [] Sibling     [] Other       Other Support System:    Is the patient responsible for care of others  [] Yes  [x]No   Information of person caring for patient on  discharge    Managers financial affairs independently  [x] Yes  []No   If no, explain:      Status Prior to Admission:    Mental Status  [x] Awake  [x] Alert  [x] Oriented  [] Quiet/Calm [] Lethargic/Sedated   [] Disoriented  [] Restless/Anxious  [] Combative   Personal Care  [] Dependent  [x] [address]  [] Requires Assistance   Meal Preparation Ability  [x] Independent   [] Standby Assistance   [] Minimal Assistance   [] Moderate Assistance  [] Maximum Assistance     [] Total Assistance   Chores  [x] Independent with Chores   [] N/A Nursing Home Resident   [] Requires Assistance   Bowel/Bladder  [x] Continent  [] Catheter  [] Incontinent  [] Ostomy Self-Care    [] Urine Diversion Self-Care  [] Maximum Assistance     [] Total Assistance   Number of Persons needed for assistance    DME at home  [] Yane Cochran  [] Tyree Cochran   [] Commode    [] Bathroom/Grab Bars  [] Hospital Bed  [] Nebulizer  [] Oxygen           [] Raised Toilet Seat  [] Shower Chair  [] Side Rails for Bed   [] Tub Transfer Bench   [] Duayne Muscat  [] Ivone Barker, Standard      [] Other:   Vendor      Treatment Presently Receiving:    Current Treatments  [] Chemotherapy  [] Dialysis  [] Insulin  [] IVAB [x] IVF   [] O2  [] PCA   [] PT   [] RT   [] Tube Feedings   [] Wound Care     Psychosocial Evaluation:    Verbalized Knowledge of Disease Process  [] Patient  []Family   Coping with Disease Process  [] Patient  []Family   Requires Further Counseling Coping with Disease Process  [] Patient  []Family     Identified Projected Needs:    Home Health Aid  [] Yes  [x]No   Transportation  [] Yes  [x]No   Education  [] Yes  [x]No        Specific Education     Financial Counseling  [] Yes  [x]No   Inability to Care for Self/Will Require 24 hour care  [] Yes  [x]No   Pain Management  [] Yes  [x]No   Home Infusion Therapy  [] Yes  [x]No   Oxygen Therapy  [] Yes  [x]No   DME  [] Yes  [x]No   Long Term Care Placement  [] Yes  [x]No Rehab  [] Yes  [x]No   Physical Therapy  [] Yes  [x]No   Needs Anticipated At This Time  [] Yes  [x]No     Intra-Hospital Referral:    [facility]  [] Yes  [x]No     [] Yes  [x]No   Patient Representative  [] Yes  [x]No   Staff for Teaching Needs  [] Yes  [x]No   Specialty Teaching Needs     Diabetic Educator  [] Yes  [x]No   Referral for Diabetic Educator Needed  [] Yes  [x]No  If Yes, place order for Nutritionist or Diabetic Consult     Tentative Discharge Plan:    Home with No Services  [x] Yes  []No   Home with [address]  [] Yes  [x]No        If Yes, specify type    [address]  [] Yes  [x]No        If Yes, specify type    Meals on Wheels  [] Yes  [x]No   Office of Aging  [] Yes  [x]No   NHP  [] Yes  [x]No   Return to the Nursing Home  [] Yes  [x]No   Rehab Therapy  [] Yes  [x]No   Acute Rehab  [] Yes  [x]No   Subacute Rehab  [] Yes  [x]No   Private Care  [] Yes  [x]No   Substance Abuse Referral  [] Yes  [x]No   Transportation  [] Yes  [x]No   Chore Service  [] Yes  [x]No   Inpatient Hospice  [] Yes  [x]No   OP RT  [] Yes  [x] No   OP Hemo  [] Yes  [x] No   OP PT  [] Yes  [x]No   Support Group  [] Yes  [x]No   Reach to Recovery  [] Yes  [x]No   OP Oncology Clinic  [] Yes  [x]No   Clinic Appointment  [] Yes  [x]No   DME  [] Yes  [x]No   Comments    Name of D/C Planner or  Given to Patient or Family Adam Walker RN BSN  Outcomes Manager  Pager # [phone]   Phone Number         Extension    Date 5/10/2017   Time [phone]   If you are discharged home, whom do you designate to participate in your discharge plan and receive any information needed?      Enter name of designee sister        Phone # of designee         Address of designee         Updated         Patient refused to designate any           individual

## 2017-05-10 NOTE — ACP (ADVANCE CARE PLANNING)
Patient has designated ___sister_____________________ to participate in his/her discharge plan and to receive any needed information.      Name: Marsha Henderson  Address: [facility]  Phone Inocencio Mendoza

## 2017-05-10 NOTE — PROGRESS NOTES
Upper extremity venous duplex exam completed. No evidence of DVT or SVT of upper extremities; stagnant decreased flow consistent with proximal occlusion/thrombosis. Dr. Gracy Mariano given a preliminary report. Complete reports can be found under the imaging tab.

## 2017-05-10 NOTE — H&P
History and Physical    Patient: Alden Lebron               Sex: male          DOA: 5/9/2017       YOB: 1940      Age:  76 y.o.        LOS:  LOS: 0 days        Chief Complaint   Patient presents with    Dizziness         HPI:     Alden Lebron is a 76 y.o. male who presents with c/o dizziness of long standing . Reports that is has become progressively worse. Had a fall a few days ago and bumped in face on the furniture. He denies loss of consciousness. Today was seen earlier at [facility] for same symptom and was discharged home after preliminary lab tests. He reports that when he went home his symptoms started again and is worse with standing. He is negative for vertigo, chest pain, headache,sob. Patient in ED had elevated ddimer. CT chest shows an occluded SVC with collateral through azygous vein. Patient was started on Heparin drip . PVL BUE pending. Vascular surgery consulted. Past Medical History:   Diagnosis Date    Atherosclerosis of abdominal aorta (HCC)     CAD, multiple vessel     Essential hypertension, benign     High cholesterol     Hypercholesteremia     Hypertension     Major depressive disorder, recurrent episode ([address]     Malignant ne[facility] of nipple of right [facility] breast [facility] [address]     Osteopenia     Prostate cancer ([address]     Renal stones     Vitamin D deficiency    . Past Surgical History:   Procedure Laterality Date    HX CORONARY ARTERY BYPASS GRAFT         No current facility-administered medications on file prior to encounter. Current Outpatient Prescriptions on File Prior to Encounter   Medication Sig Dispense Refill    sildenafil citrate (VIAGRA) 100 mg tablet Take 1 Tab by mouth daily as needed for 4 doses. 5 Tab 12       Social History     Social History    Marital status: SINGLE     Spouse name: N/A    Number of children: N/A    Years of education: N/A     Occupational History    Not on file.      Social History Main Topics    Smoking status: Not on file    Smokeless tobacco: Not on file    Alcohol use Not on file    Drug use: Not on file    Sexual activity: Not on file     Other Topics Concern    Not on file     Social History Narrative    No narrative on file       Prior to Admission Medications   Prescriptions Last Dose Informant Patient Reported? Taking? albuterol (PROVENTIL HFA, VENTOLIN HFA, PROAIR HFA) 90 mcg/actuation inhaler   Yes Yes   Sig: Take  by inhalation. alendronate (FOSAMAX) 35 mg tablet   Yes Yes   Sig: Take 35 mg by mouth every seven (7) days. aspirin 81 mg chewable tablet   Yes Yes   Sig: Take 81 mg by mouth daily. atorvastatin (LIPITOR) 80 mg tablet   Yes Yes   Sig: Take 80 mg by mouth daily. escitalopram oxalate (LEXAPRO) 20 mg tablet   Yes Yes   Sig: Take 20 mg by mouth daily. multivitamin (ONE A DAY) tablet   Yes Yes   Sig: Take 1 Tab by mouth daily. polyethylene glycol (MIRALAX) 17 gram packet   Yes Yes   Sig: Take 17 g by mouth daily. ramipril (ALTACE) 10 mg capsule   Yes Yes   Sig: Take 10 mg by mouth daily. sildenafil citrate (VIAGRA) 100 mg tablet Not Taking at Unknown time  No No   Sig: Take 1 Tab by mouth daily as needed for 4 doses. tamoxifen (NOLVADEX) 20 mg tablet   Yes Yes   Sig: Take 20 mg by mouth daily. topiramate (TOPAMAX) 50 mg tablet   Yes Yes   Sig: Take 150 mg by mouth nightly. vardenafil (LEVITRA) 20 mg tablet   Yes Yes   Sig: Take 5 mg by mouth as needed. Facility-Administered Medications: None       History reviewed. No pertinent family history. Review of Systems   Constitutional: Negative. HENT: Negative. Eyes: Negative. Respiratory: Negative. Cardiovascular: Negative. Gastrointestinal: Negative. Genitourinary: Negative. Musculoskeletal: Negative. Skin: Negative. Neurological: Positive for dizziness. Negative for sensory change, focal weakness, seizures and loss of consciousness. Endo/Heme/Allergies: Negative. Psychiatric/Behavioral: Negative. Physical Exam:       Visit Vitals    /81    Pulse 81    Temp 96.3 °F (35.7 °C)    Resp 19    Ht 6' 2\" (1.88 m)    Wt 80.7 kg (178 lb)    SpO2 97%    BMI 22.85 kg/m2       Physical Exam   Constitutional: He is oriented to person, place, and time. Vital signs are normal. He appears well-developed and well-nourished. He appears ill. HENT:   Head: Normocephalic. Mouth/Throat: Oropharynx is clear and moist. No oropharyngeal exudate. Eyes: Pupils are equal, round, and reactive to light. Left conjunctiva has a hemorrhage. No scleral icterus. Cardiovascular: Normal rate, regular rhythm and normal heart sounds. No murmur heard. Pulmonary/Chest: Effort normal and breath sounds normal. No respiratory distress. He has no wheezes. He has no rales. Abdominal: Soft. Bowel sounds are normal. He exhibits no distension. There is no tenderness. There is no guarding. Musculoskeletal: He exhibits edema (BUE AND FACIAL). Neurological: He is alert and oriented to person, place, and time. Skin: Skin is warm. No rash noted. He is not diaphoretic. No erythema. No pallor. Cyanosis upper chest head and neck    Psychiatric: He has a normal mood and affect. Ancillary Studies: All lab and imaging reviewed for the past 24 hours.     Recent Results (from the past 24 hour(s))   EKG, 12 LEAD, INITIAL    Collection Time: 05/10/17 12:26 AM   Result Value Ref Range    Ventricular Rate 79 BPM    Atrial Rate 79 BPM    P-R Interval 150 ms    QRS Duration 78 ms    Q-T Interval 392 ms    QTC Calculation (Bezet) 449 ms    Calculated P Axis 76 degrees    Calculated R Axis 19 degrees    Calculated T Axis 104 degrees    Diagnosis       Normal sinus rhythm  ST & T wave abnormality, consider lateral ischemia  Abnormal ECG  No previous ECGs available     CBC WITH AUTOMATED DIFF    Collection Time: 05/10/17 12:30 AM   Result Value Ref Range    WBC 12.3 4.6 - 13.2 K/uL    RBC 4.78 4.70 - 5.50 M/uL    HGB 14.2 13.0 - 16.0 g/dL    HCT 42.5 36.0 - 48.0 %    MCV 88.9 74.0 - 97.0 FL    MCH 29.7 24.0 - 34.0 PG    MCHC 33.4 31.0 - 37.0 g/dL    RDW 14.5 11.6 - 14.5 %    PLATELET 079 821 - 781 K/uL    MPV 10.4 9.2 - 11.8 FL    NEUTROPHILS 77 (H) 40 - 73 %    LYMPHOCYTES 13 (L) 21 - 52 %    MONOCYTES 9 3 - 10 %    EOSINOPHILS 1 0 - 5 %    BASOPHILS 0 0 - 2 %    ABS. NEUTROPHILS 9.5 (H) 1.8 - 8.0 K/UL    ABS. LYMPHOCYTES 1.6 0.9 - 3.6 K/UL    ABS. MONOCYTES 1.1 0.05 - 1.2 K/UL    ABS. EOSINOPHILS 0.1 0.0 - 0.4 K/UL    ABS. BASOPHILS 0.0 0.0 - 0.06 K/UL    DF AUTOMATED     METABOLIC PANEL, COMPREHENSIVE    Collection Time: 05/10/17 12:30 AM   Result Value Ref Range    Sodium 141 136 - 145 mmol/L    Potassium 4.2 3.5 - 5.5 mmol/L    Chloride 108 100 - 108 mmol/L    CO2 27 21 - 32 mmol/L    Anion gap 6 3.0 - 18 mmol/L    Glucose 129 (H) 74 - 99 mg/dL    BUN 28 (H) 7.0 - 18 MG/DL    Creatinine 1.42 (H) 0.6 - 1.3 MG/DL    BUN/Creatinine ratio 20 12 - 20      GFR est AA 58 (L) >60 ml/min/1.73m2    GFR est non-AA 48 (L) >60 ml/min/1.73m2    Calcium 8.4 (L) 8.5 - 10.1 MG/DL    Bilirubin, total 0.5 0.2 - 1.0 MG/DL    ALT (SGPT) 41 16 - 61 U/L    AST (SGOT) 41 (H) 15 - 37 U/L    Alk.  phosphatase 57 45 - 117 U/L    Protein, total 6.6 6.4 - 8.2 g/dL    Albumin 3.5 3.4 - 5.0 g/dL    Globulin 3.1 2.0 - 4.0 g/dL    A-G Ratio 1.1 0.8 - 1.7     TROPONIN I    Collection Time: 05/10/17 12:30 AM   Result Value Ref Range    Troponin-I, Qt. <0.02 0.0 - 0.045 NG/ML   D DIMER    Collection Time: 05/10/17 12:30 AM   Result Value Ref Range    D DIMER 2.21 (H) <0.46 ug/ml(FEU)   PTT    Collection Time: 05/10/17 12:30 AM   Result Value Ref Range    aPTT 26.8 23.0 - 36.4 SEC       Assessment/Plan     Active Problems:    Dizziness (5/10/2017)      Pneumonia (5/10/2017)      SVC (superior vena cava obstruction) (5/10/2017)      LUIS (acute kidney injury) ([facility] [address] (5/10/2017)      HTN  HLD  CAD  HX Prostate cancer  HX RT Breast cancer  Anxiety/Depression  Osteoporosis  ED  S/p Mediport  Fall  Left injection conjunctivitis       PLAN:    SVC thrombosis  - etiology unknown   - known hx of Rt breast cancer s/p chemo and has mediport in situ  - on heparin drip  - PVL BUE pending  - Vascular surgery consulted    Pna  - Rocephin and Zithromax    LUIS  - IVF     Dizziness/fall  - 2/2 SVC thrombosis   - fall precaution    HTN  - Ramipril     HLD  - Lipitor    CAD  - Aspirin, lipitor, ramipril    Anxiety/depression  - Lexapro    ED   - Viagra    Osteoporosis  - Fosamax    Prostate cancer   - s/p radiation pellets     Rt Breast cancer  - s/p chemo    Hemorrhagic conjunctivitis Left eye  - 2/2 trauma     DVT Prophylaxis - On heparin drip    Full code         Thoa Cheng MD  5/10/2017  3:43 AM

## 2017-05-10 NOTE — ED NOTES
Pt's skin color is purple from about mid chest to face. Pt states this is not normal for him. States he is extremely dizzy, appears short of breath as well.

## 2017-05-10 NOTE — PROGRESS NOTES
1956  Pt to unit from icu. Sitting in bed sister at bedside. Alert and oriented, 4lnc in place. piv # 20 to left hand with heparin gtt infusing at 10 units/hr rate 13.7 ml/hr verified with icu nurse. Piv# 18 to left hand with ns infusing at 100 ml/hr. piv # 18 to right a/c intact. Connected to telemetry box #48, verified with tech Call bell within reach side rails up x 3 bed low and locked,  No complaints offered pt instructed to call for assistance     2219 critical lab result aptt greater that 180. Heparin gtt held as per protocol. 2330  Heparin gtt reduced to 14 units/hr rate 11.3 ml/hr and restarted asper protocol. Verified with keri johnson. Next ptt in 6 hours     0720 Bedside and Verbal shift change report given to 1305 Sedrick Salas rn  (oncoming nurse) by Bhanu Galindo rn  (offgoing nurse). Report included the following information SBAR, Kardex, Intake/Output, MAR, Recent Results and Cardiac Rhythm nsr.

## 2017-05-10 NOTE — PROCEDURES
[facility] [address]  *** FINAL REPORT ***    Name: Roni Ricketts  MRN: [id number]    Inpatient  : 10 Feb 1940  HIS Order #: [id number]  [address] [facility] Visit #: [id number]  Date: 10 May 2017    TYPE OF TEST: Peripheral Venous Testing    REASON FOR TEST  SVC thrombosis    Right Arm:-  Deep venous thrombosis:           No  Superficial venous thrombosis:    No    Left Arm:-  Deep venous thrombosis:           No  Superficial venous thrombosis:    No      INTERPRETATION/FINDINGS  Duplex images were obtained using 2-D gray scale, color flow, and  spectral Doppler analysis. Right arm :  1. Deep vein(s) visualized include the internal jugular, subclavian,  axillary and brachial veins. 2. Slow, stagnant flow throughout, consistent with proximal occlusion/   thrombosis. 3. No evidence of deep venous thrombosis detected in the veins  visualized. 4. Superficial vein(s) visualized include the basilic (upper arm) and  cephalic (upper arm) veins. 5. No evidence of superficial thrombosis detected. Left arm :  1. Deep vein(s) visualized include the internal jugular, subclavian,  axillary and brachial veins. 2. Slow, stagnant flow throughout, consistent with proximal occlusion/   thrombosis. 3. No evidence of deep venous thrombosis detected in the veins  visualized. 4. Superficial vein(s) visualized include the basilic (upper arm) and  cephalic (upper arm) veins. 5. No evidence of superficial thrombosis detected. Maria D Curtis was notified of preliminary report. I have personally reviewed the data relevant to the interpretation of  this  study. TECHNOLOGIST: Eston Paget.  Fellowes, RTR, RVT  Signed: 05/10/2017 09:57 AM    PHYSICIAN: Kendy Powell MD  Signed: 2017 07:25 AM

## 2017-05-11 LAB
APTT PPP: 109.6 SEC (ref 23–36.4)
APTT PPP: 68.7 SEC (ref 23–36.4)
APTT PPP: 75.9 SEC (ref 23–36.4)
BASOPHILS # BLD AUTO: 0 K/UL (ref 0–0.06)
BASOPHILS # BLD: 0 % (ref 0–2)
DIFFERENTIAL METHOD BLD: ABNORMAL
EOSINOPHIL # BLD: 0.1 K/UL (ref 0–0.4)
EOSINOPHIL NFR BLD: 1 % (ref 0–5)
ERYTHROCYTE [DISTWIDTH] IN BLOOD BY AUTOMATED COUNT: 14.7 % (ref 11.6–14.5)
GLUCOSE BLD STRIP.AUTO-MCNC: 141 MG/DL (ref 70–110)
GLUCOSE BLD STRIP.AUTO-MCNC: 75 MG/DL (ref 70–110)
GLUCOSE BLD STRIP.AUTO-MCNC: 89 MG/DL (ref 70–110)
GLUCOSE BLD STRIP.AUTO-MCNC: 98 MG/DL (ref 70–110)
HCT VFR BLD AUTO: 39 % (ref 36–48)
HGB BLD-MCNC: 12.4 G/DL (ref 13–16)
INR PPP: 1.2 (ref 0.8–1.2)
LYMPHOCYTES # BLD AUTO: 21 % (ref 21–52)
LYMPHOCYTES # BLD: 1.9 K/UL (ref 0.9–3.6)
MCH RBC QN AUTO: 28.9 PG (ref 24–34)
MCHC RBC AUTO-ENTMCNC: 31.8 G/DL (ref 31–37)
MCV RBC AUTO: 90.9 FL (ref 74–97)
MONOCYTES # BLD: 0.7 K/UL (ref 0.05–1.2)
MONOCYTES NFR BLD AUTO: 7 % (ref 3–10)
NEUTS SEG # BLD: 6.6 K/UL (ref 1.8–8)
NEUTS SEG NFR BLD AUTO: 71 % (ref 40–73)
PLATELET # BLD AUTO: 250 K/UL (ref 135–420)
PMV BLD AUTO: 10.8 FL (ref 9.2–11.8)
PROTHROMBIN TIME: 14.5 SEC (ref 11.5–15.2)
RBC # BLD AUTO: 4.29 M/UL (ref 4.7–5.5)
WBC # BLD AUTO: 9.3 K/UL (ref 4.6–13.2)

## 2017-05-11 PROCEDURE — 36415 COLL VENOUS BLD VENIPUNCTURE: CPT | Performed by: FAMILY MEDICINE

## 2017-05-11 PROCEDURE — 74011000258 HC RX REV CODE- 258: Performed by: EMERGENCY MEDICINE

## 2017-05-11 PROCEDURE — 74011250636 HC RX REV CODE- 250/636: Performed by: HOSPITALIST

## 2017-05-11 PROCEDURE — 82962 GLUCOSE BLOOD TEST: CPT

## 2017-05-11 PROCEDURE — 85730 THROMBOPLASTIN TIME PARTIAL: CPT | Performed by: HOSPITALIST

## 2017-05-11 PROCEDURE — 74011250636 HC RX REV CODE- 250/636: Performed by: FAMILY MEDICINE

## 2017-05-11 PROCEDURE — 85610 PROTHROMBIN TIME: CPT | Performed by: FAMILY MEDICINE

## 2017-05-11 PROCEDURE — 74011250637 HC RX REV CODE- 250/637: Performed by: FAMILY MEDICINE

## 2017-05-11 PROCEDURE — 77030020263 HC SOL INJ SOD CL0.9% LFCR 1000ML

## 2017-05-11 PROCEDURE — 74011250636 HC RX REV CODE- 250/636: Performed by: EMERGENCY MEDICINE

## 2017-05-11 PROCEDURE — 65660000000 HC RM CCU STEPDOWN

## 2017-05-11 PROCEDURE — 85025 COMPLETE CBC W/AUTO DIFF WBC: CPT | Performed by: FAMILY MEDICINE

## 2017-05-11 PROCEDURE — A6209 FOAM DRSG <=16 SQ IN W/O BDR: HCPCS

## 2017-05-11 PROCEDURE — 74011250636 HC RX REV CODE- 250/636

## 2017-05-11 RX ORDER — HEPARIN SODIUM 1000 [USP'U]/ML
INJECTION, SOLUTION INTRAVENOUS; SUBCUTANEOUS
Status: COMPLETED
Start: 2017-05-11 | End: 2017-05-11

## 2017-05-11 RX ADMIN — ESCITALOPRAM OXALATE 20 MG: 10 TABLET ORAL at 09:56

## 2017-05-11 RX ADMIN — TAMOXIFEN CITRATE 20 MG: 10 TABLET ORAL at 15:07

## 2017-05-11 RX ADMIN — ASPIRIN 81 MG CHEWABLE TABLET 81 MG: 81 TABLET CHEWABLE at 09:56

## 2017-05-11 RX ADMIN — HEPARIN SODIUM AND DEXTROSE 16 UNITS/KG/HR: 10000; 5 INJECTION INTRAVENOUS at 23:19

## 2017-05-11 RX ADMIN — ATORVASTATIN CALCIUM 80 MG: 40 TABLET, FILM COATED ORAL at 09:56

## 2017-05-11 RX ADMIN — MULTIVITAMIN TABLET 1 TABLET: TABLET at 09:56

## 2017-05-11 RX ADMIN — HEPARIN SODIUM AND DEXTROSE 14 UNITS/KG/HR: 10000; 5 INJECTION INTRAVENOUS at 03:33

## 2017-05-11 RX ADMIN — HEPARIN SODIUM 3200 UNITS: 1000 INJECTION, SOLUTION INTRAVENOUS; SUBCUTANEOUS at 09:53

## 2017-05-11 RX ADMIN — SODIUM CHLORIDE 100 ML/HR: 900 INJECTION, SOLUTION INTRAVENOUS at 19:21

## 2017-05-11 RX ADMIN — SODIUM CHLORIDE 500 MG: 900 INJECTION, SOLUTION INTRAVENOUS at 04:11

## 2017-05-11 RX ADMIN — CEFTRIAXONE 2 G: 2 INJECTION, POWDER, FOR SOLUTION INTRAMUSCULAR; INTRAVENOUS at 03:29

## 2017-05-11 RX ADMIN — RAMIPRIL 10 MG: 5 CAPSULE ORAL at 09:56

## 2017-05-11 NOTE — PROGRESS NOTES
Assumed care of alert and oriented male pt. Pt denies pain, skin color to upper body debra/ bluish, +3 edema to bilateral upper extremities. No edema noted in lower extremities. Hep gtt infusing as ordered. Pt SR on telemetry monitor.

## 2017-05-11 NOTE — PROGRESS NOTES
1956  Received bedside verbal shift report, pt sitting in watching tv. 4lnc in place. piv # 20 to left hand with heparin gtt infusing at 16 units/hr rate 12.9. Piv# 18 to left hand with ns infusing at 100 ml/hr. piv # 18 to right a/c intact. call bell within reach side rails up x 3 bed low and locked, No complaints offered pt instructed to call for assistance    0740 Bedside and Verbal shift change report given to oscar rodriguez rn  (oncoming nurse) by taryn alcantar rn  (offgoing nurse). Report included the following information SBAR, Kardex, Intake/Output, MAR, Recent Results and Cardiac Rhythm nsr.

## 2017-05-11 NOTE — PROGRESS NOTES
Progress Note      Patient: Waldo Dao               Sex: male          DOA: 5/9/2017       YOB: 1940      Age:  76 y.o.        LOS:  LOS: 1 day             CHIEF COMPLAINT: SVC syndrome in the setting of breast cancer history    Subjective:     Patient feels better today. He follows with Dr Abel Ravi at [facility]    Objective:      Visit Vitals    /68 (BP 1 Location: Left arm, BP Patient Position: Head of bed elevated (Comment degrees))    Pulse 68    Temp 97.9 °F (36.6 °C)    Resp 18    Ht 6' 2\" (1.88 m)    Wt 80.7 kg (178 lb)    SpO2 98%    BMI 22.85 kg/m2       Physical Exam:  Gen:  Patient is in no distress. No complaint  HEENT and Neck:  PERRL, No cervical tenderness or masses  Lungs:  Clear bilaterally. No rales, no wheeze. Normal effort. Heart:  Regular Rate and Rhythm. No murmur or gallop. No JVD. No edema. Abdomen:  Soft, non tender, no masses, no Hepatosplenomegally  Extremities:  No digital clubbing, no cyanosis  Neuro:  Alert and oriented, Normal affect, Cranial nerves intact, No sensory deficits        Lab/Data Reviewed:  BMP: No results found for: NA, K, CL, CO2, AGAP, GLU, BUN, CREA, GFRAA, GFRNA  CBC:   Lab Results   Component Value Date/Time    WBC 9.3 05/11/2017 05:45 AM    HGB 12.4 (L) 05/11/2017 05:45 AM    HCT 39.0 05/11/2017 05:45 AM     05/11/2017 05:45 AM           Assessment/Plan     Active Problems:    Dizziness (5/10/2017)      Pneumonia (5/10/2017)      SVC (superior vena cava obstruction) (5/10/2017)      LUIS (acute kidney injury) ([facility] [address] (5/10/2017)    Breast Cancer, previously treated with Tamoxifen  Remote history of Prostate Cancer  New Lymphadenopathy visible by CTA, likely causing SVC syndrome    Plan:  Discussed with Dr Abel Ravi, patient had been considered cancer free. His newly enlarged lymphadenopathy is of unclear etiology, it could represent breast cancer, or new primary lung cancer, or new separate primary.   Will require tissue diagnosis. Have discussed with Interventional Radiology, this more likely would be reachable via Bronchoscopy. Will discuss with Pulmology  Discussed diagnosis with patient and sister at the bedside.

## 2017-05-11 NOTE — ROUTINE PROCESS
Bedside shift change report given to Bea Frank (oncoming nurse) by Melani Staley RN (offgoing nurse). Report included the following information SBAR, Kardex, Intake/Output, MAR, Recent Results and Cardiac Rhythm SR 68.

## 2017-05-11 NOTE — CONSULTS
PCCM  Consultation  Thurs 5/11/2017      Discussed w/ Dr Rupali Padron. There is some question as the etiology of the metatstatic LN. Data:  VSS Afeb  On heparin drip; Plan is for SVC stent in AM    IMPRESSION:  # see my note of 5/10  # Hx of prostate and breast cancer; has a mediport that is surrounded by occlusive thrombus in the SVC. # Large apparently new paratracheal adenopathy suspicious for neoplasm     PLAN:  I can boris TBNA but need to stop heparin for the procedure and it will require intubation/ sedation and cytology on site to ensure we get adequate tissue for decision making. I will plan on tomorrow AM as long as endo suite and others can accomodate.  But otherwise will have to wait till tomorrow afternoon or next week.      -[facility]   [phone]

## 2017-05-11 NOTE — CONSULTS
Vascular Surgery Consultation  [facility]    Subjective:   75 y/o male admitted early this a.m. With dizziness, with edema of face/ neck with cyanotic appearing skin. CT revealed occluded SVC with bulky mediastinal lymph nodes. Pt has h/o breast CA and prostate Ca. Since admission - sitting at least 45 degrees with heparin infusion- he feels better. No dyspnea or difficulty swallowing. Pt has L IJ mediport in place several years. Patient Active Problem List    Diagnosis Date Noted    Dizziness 05/10/2017    Pneumonia 05/10/2017    SVC (superior vena cava obstruction) 05/10/2017    LUIS (acute kidney injury) ([facility] [address] 05/10/2017     Past Medical History:   Diagnosis Date    Atherosclerosis of abdominal aorta (HCC)     CAD, multiple vessel     Essential hypertension, benign     High cholesterol     Hypercholesteremia     Hypertension     Major depressive disorder, recurrent episode ([address]     Malignant neoplasm of nipple of right male breast ([address]     Osteopenia     Prostate cancer ([address]     Renal stones     Vitamin D deficiency       Past Surgical History:   Procedure Laterality Date    HX CORONARY ARTERY BYPASS GRAFT        Social History   Substance Use Topics    Smoking status: Not on file    Smokeless tobacco: Not on file    Alcohol use Not on file      History reviewed. No pertinent family history. Prior to Admission medications    Medication Sig Start Date End Date Taking? Authorizing Provider   albuterol (PROVENTIL HFA, VENTOLIN HFA, PROAIR HFA) 90 mcg/actuation inhaler Take  by inhalation. Yes Pura Sousa MD   alendronate (FOSAMAX) 35 mg tablet Take 35 mg by mouth every seven (7) days. Yes Pura Sousa MD   aspirin 81 mg chewable tablet Take 81 mg by mouth daily. Yes Pura Sousa MD   atorvastatin (LIPITOR) 80 mg tablet Take 80 mg by mouth daily. Yes Pura Sousa MD   escitalopram oxalate (LEXAPRO) 20 mg tablet Take 20 mg by mouth daily.    Yes Pura Sousa MD multivitamin (ONE A DAY) tablet Take 1 Tab by mouth daily. Yes Pura Sousa MD   polyethylene glycol (MIRALAX) 17 gram packet Take 17 g by mouth daily. Yes Pura Sousa MD   ramipril (ALTACE) 10 mg capsule Take 10 mg by mouth daily. Yes Pura Sousa MD   tamoxifen (NOLVADEX) 20 mg tablet Take 20 mg by mouth daily. Yes Pura Sousa MD   topiramate (TOPAMAX) 50 mg tablet Take 150 mg by mouth nightly. Yes Pura Sousa MD   vardenafil (LEVITRA) 20 mg tablet Take 5 mg by mouth as needed. Yes Pura Sousa MD   sildenafil citrate (VIAGRA) 100 mg tablet Take 1 Tab by mouth daily as needed for 4 doses. 12   Tatianna Sharpe MD     Allergies   Allergen Reactions    Chantix [Varenicline] Other (comments)    Effexor [Venlafaxine] Other (comments)         Review of Systems:   Gen -no  fever / chills,  No weight changeNormal appetite. HEENT - denies vision changes, no dysphagia  PULM - no cough/dyspnea  CV - denies chest pain, palpitations or cardiac problems   GI - no abd pain, no n/v, no diarrhea/constipation, no blood per rectum   - denies dysuria/hematuria/frequency  SKIN - no ulcers or dermatitits  MS - no joint pain or arthritis. NEURO - denies prior stroke or TIA, no seizure history, no significant headaches, no focal weakness. +dizziness. Objective:     Visit Vitals    /69 (BP 1 Location: Right arm, BP Patient Position: Supine)    Pulse 71    Temp 97.4 °F (36.3 °C)    Resp 20    Ht 6' 2\" (1.88 m)    Wt 80.7 kg (178 lb)    SpO2 96%    BMI 22.85 kg/m2       Temp (24hrs), Av.3 °F (36.3 °C), Min:96.3 °F (35.7 °C), Max:97.8 °F (36.6 °C)      Physical Exam:  GEN: A&Ox3, NAD, ill appearing  HEENT:PERRL EOMI, non icteric, moist membranes. Face / neck edema mild with bluish / cyanotic discoloration  of the skin to shoulders. Although the patient reports it has improved since starting heparin.    NECK: nontender, no JVD, no carotid bruit, no LA, no TM  LUNG: clear b/l and unlabored breathing  HEART: regular   ABD: soft, NT, NABS   EXT: no arm edema, no LE edema. PULSES: palpable radial and pedal pulses. NEURO: no focal lateralizing deficits noted. Motor 5/5. Labs:   Recent Results (from the past 24 hour(s))   EKG, 12 LEAD, INITIAL    Collection Time: 05/10/17 12:26 AM   Result Value Ref Range    Ventricular Rate 79 BPM    Atrial Rate 79 BPM    P-R Interval 150 ms    QRS Duration 78 ms    Q-T Interval 392 ms    QTC Calculation (Bezet) 449 ms    Calculated P Axis 76 degrees    Calculated R Axis 19 degrees    Calculated T Axis 104 degrees    Diagnosis       Normal sinus rhythm  Technically poor tracing  No previous ECGs available  Confirmed by Isabella Pierre ([id number] [id number]) on 5/10/2017 8:02:41 AM     CBC WITH AUTOMATED DIFF    Collection Time: 05/10/17 12:30 AM   Result Value Ref Range    WBC 12.3 4.6 - 13.2 K/uL    RBC 4.78 4.70 - 5.50 M/uL    HGB 14.2 13.0 - 16.0 g/dL    HCT 42.5 36.0 - 48.0 %    MCV 88.9 74.0 - 97.0 FL    MCH 29.7 24.0 - 34.0 PG    MCHC 33.4 31.0 - 37.0 g/dL    RDW 14.5 11.6 - 14.5 %    PLATELET 918 665 - 470 K/uL    MPV 10.4 9.2 - 11.8 FL    NEUTROPHILS 77 (H) 40 - 73 %    LYMPHOCYTES 13 (L) 21 - 52 %    MONOCYTES 9 3 - 10 %    EOSINOPHILS 1 0 - 5 %    BASOPHILS 0 0 - 2 %    ABS. NEUTROPHILS 9.5 (H) 1.8 - 8.0 K/UL    ABS. LYMPHOCYTES 1.6 0.9 - 3.6 K/UL    ABS. MONOCYTES 1.1 0.05 - 1.2 K/UL    ABS. EOSINOPHILS 0.1 0.0 - 0.4 K/UL    ABS.  BASOPHILS 0.0 0.0 - 0.06 K/UL    DF AUTOMATED     METABOLIC PANEL, COMPREHENSIVE    Collection Time: 05/10/17 12:30 AM   Result Value Ref Range    Sodium 141 136 - 145 mmol/L    Potassium 4.2 3.5 - 5.5 mmol/L    Chloride 108 100 - 108 mmol/L    CO2 27 21 - 32 mmol/L    Anion gap 6 3.0 - 18 mmol/L    Glucose 129 (H) 74 - 99 mg/dL    BUN 28 (H) 7.0 - 18 MG/DL    Creatinine 1.42 (H) 0.6 - 1.3 MG/DL    BUN/Creatinine ratio 20 12 - 20      GFR est AA 58 (L) >60 ml/min/1.73m2    GFR est non-AA 48 (L) >60 ml/min/1.73m2    Calcium 8.4 (L) 8.5 - 10.1 MG/DL    Bilirubin, total 0.5 0.2 - 1.0 MG/DL    ALT (SGPT) 41 16 - 61 U/L    AST (SGOT) 41 (H) 15 - 37 U/L    Alk.  phosphatase 57 45 - 117 U/L    Protein, total 6.6 6.4 - 8.2 g/dL    Albumin 3.5 3.4 - 5.0 g/dL    Globulin 3.1 2.0 - 4.0 g/dL    A-G Ratio 1.1 0.8 - 1.7     TROPONIN I    Collection Time: 05/10/17 12:30 AM   Result Value Ref Range    Troponin-I, Qt. <0.02 0.0 - 0.045 NG/ML   D DIMER    Collection Time: 05/10/17 12:30 AM   Result Value Ref Range    D DIMER 2.21 (H) <0.46 ug/ml(FEU)   PTT    Collection Time: 05/10/17 12:30 AM   Result Value Ref Range    aPTT 26.8 23.0 - 36.4 SEC   CULTURE, BLOOD    Collection Time: 05/10/17  3:43 AM   Result Value Ref Range    Special Requests: NO SPECIAL REQUESTS      Culture result: NO GROWTH AFTER 1 HOUR     CULTURE, BLOOD    Collection Time: 05/10/17  3:53 AM   Result Value Ref Range    Special Requests: NO SPECIAL REQUESTS      Culture result: NO GROWTH AFTER 1 HOUR     METABOLIC PANEL, BASIC    Collection Time: 05/10/17  6:40 AM   Result Value Ref Range    Sodium 141 136 - 145 mmol/L    Potassium 4.4 3.5 - 5.5 mmol/L    Chloride 110 (H) 100 - 108 mmol/L    CO2 23 21 - 32 mmol/L    Anion gap 8 3.0 - 18 mmol/L    Glucose 103 (H) 74 - 99 mg/dL    BUN 25 (H) 7.0 - 18 MG/DL    Creatinine 1.25 0.6 - 1.3 MG/DL    BUN/Creatinine ratio 20 12 - 20      GFR est AA >60 >60 ml/min/1.73m2    GFR est non-AA 56 (L) >60 ml/min/1.73m2    Calcium 8.0 (L) 8.5 - 10.1 MG/DL   PTT    Collection Time: 05/10/17 10:15 AM   Result Value Ref Range    aPTT 144.3 (H) 23.0 - 36.4 SEC   GLUCOSE, POC    Collection Time: 05/10/17 12:48 PM   Result Value Ref Range    Glucose (POC) 87 70 - 110 mg/dL   PTT    Collection Time: 05/10/17  3:34 PM   Result Value Ref Range    aPTT 57.1 (H) 23.0 - 36.4 SEC   PTT    Collection Time: 05/10/17  9:28 PM   Result Value Ref Range    aPTT >180.0 (HH) 23.0 - 36.4 SEC   GLUCOSE, POC    Collection Time: 05/10/17 10:47 PM   Result Value Ref Range    Glucose (POC) 95 70 - 110 mg/dL     CTA chest; 5/10/17: 1. Nondiagnostic for pulmonary embolism, due to occluded SVC. Etiology of the thrombus is unknown and may be from the left Mediport or malignant thrombus. 2. Significant low-density mediastinal and right hilar adenopathy concerning for necrotic metastatic disease, infection i  3. Left axillary edema, likely from the occluded SVC. 4.RUL airspace disease concerning for pneumonia, subtle lareas of tree-in-bud density in the RLL and CARRIE/lingula, likely inflammatory/infectious. 5. Indeterminate left adrenal gland nodule. Tiny hepatic hypodensities    Bilateral UE venous duplex 5/10/17: no UE DVT, bilateral IJ /Subclavian veins with sluggish flow but no DVT seen. Assessment:     Active Problems:    Dizziness (5/10/2017)      Pneumonia (5/10/2017)      SVC (superior vena cava obstruction) (5/10/2017)      LUIS (acute kidney injury) ([facility] [address] (5/10/2017)        Plan:   Continues heparin and head/chest elevation  Plan central chest venogram with possible balloon expandable stent placement Friday. 5/12. Discussed with pt and family. Kelsy Barraza.  Johnny Graham, [address] [facility]  Pgsn - [phone]  May 10, 2017  11:38 PM

## 2017-05-11 NOTE — PROGRESS NOTES
Nutrition initial assessment/  Plan of care      RECOMMENDATIONS:     1. Cardiac, 2 gm NA diet  2. Monitor weight, labs and PO intake  3. RD to follow     GOALS:     1. PO intake meets >75% of protein/calorie needs by 5/18  2. Weight Maintenance (+/- 1-2 lb by 5/18)      ASSESSMENT:     Weight status is classified is classified as normal per BMI of 22.8. However, patient is at nutrition risk due to BMI below 23 with patient above 70years of age. PO intake appears adequate. Labs noted. Nutrition recommendations listed. RD to follow. Nutrition Diagnoses:   No nutrition diagnosis at this time. Nutrition Risk:  [] High  [] Moderate [x]  Low    SUBJECTIVE/OBJECTIVE:      Patient admitted with syncope and pneumonia. Patient with a good appetite. Observed 100% intake of breakfast meal. Weight stable per documented weights. No known food allergies. Will monitor. Information Obtained from:    [x] Chart Review   [x] Patient   [] Family/Caregiver   [] Nurse/Physician   [] Interdisciplinary Meeting/Rounds    Diet:Cardiac, 2 gm NA diet  Medications: [x] Reviewed    Allergies: [x] Reviewed   Encounter Diagnoses     ICD-10-CM ICD-9-CM   1. Near syncope R55 780.2   2. SVCO (superior vena cava obstruction) I87.1 459.2   3.  CAP (community acquired pneumonia) J18.9 5     Past Medical History:   Diagnosis Date    Atherosclerosis of abdominal aorta (HCC)     CAD, multiple vessel     Essential hypertension, benign     High cholesterol     Hypercholesteremia     Hypertension     Major depressive disorder, recurrent episode ([address]     Malignant neoplasm of nipple of right male breast ([address]     Osteopenia     Prostate cancer ([address]     Renal stones     Vitamin D deficiency       Labs:  Lab Results   Component Value Date/Time    Sodium 141 05/10/2017 06:40 AM    Potassium 4.4 05/10/2017 06:40 AM    Chloride 110 05/10/2017 06:40 AM    CO2 23 05/10/2017 06:40 AM    Anion gap 8 05/10/2017 06:40 AM    Glucose 103 05/10/2017 06:40 AM    BUN 25 05/10/2017 06:40 AM    Creatinine 1.25 05/10/2017 06:40 AM    Calcium 8.0 05/10/2017 06:40 AM    Albumin 3.5 05/10/2017 12:30 AM     Anthropometrics: BMI (calculated): 22.8  Last 3 Recorded Weights in this Encounter    05/09/17 2355   Weight: 80.7 kg (178 lb)    Ht Readings from Last 1 Encounters:   05/09/17 6' 2\" (1.88 m)     Patient Vitals for the past 100 hrs:   % Diet Eaten   05/10/17 1800 25 %     IBW: 190 lb %IBW: 93%  [] Weight Loss [] Weight Gain [x] Weight Stable    Estimated Nutrition Needs: [x] MSJ  [] Other:  Calories: 2070 Kcal Based on:   [x] Actual BW    Protein:   81-97 g Based on:   [x] Actual BW    Fluid:       6022-6358 ml Based on:   [x] Actual BW      [x] No Cultural, Worship or ethnic dietary need identified.     [] Cultural, Worship and ethnic food preferences identified and addressed     Wt Status:  [x] Normal (18.6 - 24.9) [] Underweight (<18.5) [] Overweight (25 - 29.9) [] Mild Obesity (30 - 34.9)  [] Moderate Obesity (35 - 39.9) [] Morbid Obesity (40+)     Nutrition Problems Identified:   [] Suboptimal PO intake   [] Food Allergies  [] Difficulty chewing/swallowing/poor dentition  [] Constipation/Diarrhea   [] Nausea/Vomiting   [x] None  [] Other:     Plan:   [x] Therapeutic Diet  []  Obtained/adjusted food preferences/tolerances and/or snacks options   []  Supplements added   [] Occupational therapy following for feeding techniques  []  HS snack added   []  Modify diet texture   []  Modify diet for food allergies   []  Assist with menu selection   [x]  Monitor PO intake on meal rounds   [x]  Continue inpatient monitoring and intervention   []  Participated in discharge planning/Interdisciplinary rounds/Team meetings   []  Other:     Education Needs:   [] Not appropriate for teaching at this time due to:   [x] Identified and addressed    Nutrition Monitoring and Evaluation:  [x] Continue ongoing monitoring and intervention  [] Other    Nya ADAMS Andrew

## 2017-05-11 NOTE — PROGRESS NOTES
conducted an initial consultation and Spiritual Assessment for Seema Marte, who is a 76 y.o.,male. Patients Primary Language is: [place]. According to the patients EMR Latter day Affiliation is: No preference. The reason the Patient came to the hospital is:   Patient Active Problem List    Diagnosis Date Noted    Dizziness 05/10/2017    Pneumonia 05/10/2017    SVC (superior vena cava obstruction) 05/10/2017    LUIS (acute kidney injury) ([facility] [address] 05/10/2017        The  provided the following Interventions:  Initiated a relationship of care and support. Explored issues of black, belief, spirituality and Congregational/ritual needs while hospitalized. Listened empathically. Provided chaplaincy education. Provided information about Spiritual Care Services. Offered prayer and assurance of continued prayers on patient's behalf. Chart reviewed. The following outcomes were achieved:  Patient shared limited information about both their medical narrative and spiritual journey/beliefs. Patient processed feeling about current hospitalization. Patient expressed gratitude for the 's visit. Assessment:  Patient does not have any Congregational/cultural needs that will affect patients preferences in health care. Patient did not indicate any spiritual or Congregational issues which require Spiritual Care Services interventions at this time. Plan:  Chaplains will continue to follow and will provide pastoral care on an as needed/requested basis.  recommends bedside caregivers page  on duty if patient shows signs of acute spiritual or emotional distress.     [address] [facility]   Staff [address] [facility]   [phone]

## 2017-05-12 LAB
ANION GAP BLD CALC-SCNC: 10 MMOL/L (ref 3–18)
APTT PPP: 97.6 SEC (ref 23–36.4)
BASOPHILS # BLD AUTO: 0 K/UL (ref 0–0.06)
BASOPHILS # BLD: 0 % (ref 0–2)
BUN SERPL-MCNC: 21 MG/DL (ref 7–18)
BUN/CREAT SERPL: 23 (ref 12–20)
CALCIUM SERPL-MCNC: 8 MG/DL (ref 8.5–10.1)
CHLORIDE SERPL-SCNC: 107 MMOL/L (ref 100–108)
CO2 SERPL-SCNC: 23 MMOL/L (ref 21–32)
CREAT SERPL-MCNC: 0.91 MG/DL (ref 0.6–1.3)
DIFFERENTIAL METHOD BLD: ABNORMAL
EOSINOPHIL # BLD: 0.1 K/UL (ref 0–0.4)
EOSINOPHIL NFR BLD: 1 % (ref 0–5)
ERYTHROCYTE [DISTWIDTH] IN BLOOD BY AUTOMATED COUNT: 14.8 % (ref 11.6–14.5)
GLUCOSE BLD STRIP.AUTO-MCNC: 114 MG/DL (ref 70–110)
GLUCOSE BLD STRIP.AUTO-MCNC: 118 MG/DL (ref 70–110)
GLUCOSE BLD STRIP.AUTO-MCNC: 93 MG/DL (ref 70–110)
GLUCOSE SERPL-MCNC: 85 MG/DL (ref 74–99)
HCT VFR BLD AUTO: 38.5 % (ref 36–48)
HGB BLD-MCNC: 12.4 G/DL (ref 13–16)
LYMPHOCYTES # BLD AUTO: 15 % (ref 21–52)
LYMPHOCYTES # BLD: 1.5 K/UL (ref 0.9–3.6)
MCH RBC QN AUTO: 29 PG (ref 24–34)
MCHC RBC AUTO-ENTMCNC: 32.2 G/DL (ref 31–37)
MCV RBC AUTO: 90.2 FL (ref 74–97)
MONOCYTES # BLD: 0.8 K/UL (ref 0.05–1.2)
MONOCYTES NFR BLD AUTO: 9 % (ref 3–10)
NEUTS SEG # BLD: 7.4 K/UL (ref 1.8–8)
NEUTS SEG NFR BLD AUTO: 75 % (ref 40–73)
PLATELET # BLD AUTO: 246 K/UL (ref 135–420)
PMV BLD AUTO: 11.2 FL (ref 9.2–11.8)
POTASSIUM SERPL-SCNC: 4.2 MMOL/L (ref 3.5–5.5)
RBC # BLD AUTO: 4.27 M/UL (ref 4.7–5.5)
SODIUM SERPL-SCNC: 140 MMOL/L (ref 136–145)
WBC # BLD AUTO: 9.7 K/UL (ref 4.6–13.2)

## 2017-05-12 PROCEDURE — 65660000000 HC RM CCU STEPDOWN

## 2017-05-12 PROCEDURE — 74011250636 HC RX REV CODE- 250/636

## 2017-05-12 PROCEDURE — 027V3DZ DILATION OF SUPERIOR VENA CAVA WITH INTRALUMINAL DEVICE, PERCUTANEOUS APPROACH: ICD-10-PCS | Performed by: SURGERY

## 2017-05-12 PROCEDURE — 0JPT0XZ REMOVAL OF TUNNELED VASCULAR ACCESS DEVICE FROM TRUNK SUBCUTANEOUS TISSUE AND FASCIA, OPEN APPROACH: ICD-10-PCS | Performed by: SURGERY

## 2017-05-12 PROCEDURE — 99153 MOD SED SAME PHYS/QHP EA: CPT

## 2017-05-12 PROCEDURE — 74011250636 HC RX REV CODE- 250/636: Performed by: FAMILY MEDICINE

## 2017-05-12 PROCEDURE — 74011000250 HC RX REV CODE- 250: Performed by: SURGERY

## 2017-05-12 PROCEDURE — 36415 COLL VENOUS BLD VENIPUNCTURE: CPT | Performed by: HOSPITALIST

## 2017-05-12 PROCEDURE — 85025 COMPLETE CBC W/AUTO DIFF WBC: CPT | Performed by: HOSPITALIST

## 2017-05-12 PROCEDURE — 80048 BASIC METABOLIC PNL TOTAL CA: CPT | Performed by: HOSPITALIST

## 2017-05-12 PROCEDURE — 74011250637 HC RX REV CODE- 250/637: Performed by: FAMILY MEDICINE

## 2017-05-12 PROCEDURE — 74011636320 HC RX REV CODE- 636/320: Performed by: SURGERY

## 2017-05-12 PROCEDURE — 74011250636 HC RX REV CODE- 250/636: Performed by: SURGERY

## 2017-05-12 PROCEDURE — B5181ZZ FLUOROSCOPY OF SUPERIOR VENA CAVA USING LOW OSMOLAR CONTRAST: ICD-10-PCS | Performed by: SURGERY

## 2017-05-12 PROCEDURE — 05743DZ DILATION OF LEFT INNOMINATE VEIN WITH INTRALUMINAL DEVICE, PERCUTANEOUS APPROACH: ICD-10-PCS | Performed by: SURGERY

## 2017-05-12 PROCEDURE — 77010033678 HC OXYGEN DAILY

## 2017-05-12 PROCEDURE — 82962 GLUCOSE BLOOD TEST: CPT

## 2017-05-12 PROCEDURE — 85730 THROMBOPLASTIN TIME PARTIAL: CPT | Performed by: HOSPITALIST

## 2017-05-12 PROCEDURE — 74011000258 HC RX REV CODE- 258: Performed by: EMERGENCY MEDICINE

## 2017-05-12 PROCEDURE — 0JH60XZ INSERTION OF TUNNELED VASCULAR ACCESS DEVICE INTO CHEST SUBCUTANEOUS TISSUE AND FASCIA, OPEN APPROACH: ICD-10-PCS | Performed by: SURGERY

## 2017-05-12 PROCEDURE — 77030020263 HC SOL INJ SOD CL0.9% LFCR 1000ML

## 2017-05-12 PROCEDURE — 74011250636 HC RX REV CODE- 250/636: Performed by: EMERGENCY MEDICINE

## 2017-05-12 RX ORDER — LIDOCAINE HYDROCHLORIDE 10 MG/ML
1-60 INJECTION INFILTRATION; PERINEURAL
Status: DISCONTINUED | OUTPATIENT
Start: 2017-05-12 | End: 2017-05-12

## 2017-05-12 RX ORDER — HEPARIN SODIUM 1000 [USP'U]/ML
10-10000 INJECTION, SOLUTION INTRAVENOUS; SUBCUTANEOUS
Status: DISCONTINUED | OUTPATIENT
Start: 2017-05-12 | End: 2017-05-12

## 2017-05-12 RX ORDER — MIDAZOLAM HYDROCHLORIDE 1 MG/ML
.5-2 INJECTION, SOLUTION INTRAMUSCULAR; INTRAVENOUS
Status: DISCONTINUED | OUTPATIENT
Start: 2017-05-12 | End: 2017-05-12

## 2017-05-12 RX ORDER — HEPARIN SODIUM 200 [USP'U]/100ML
500 INJECTION, SOLUTION INTRAVENOUS ONCE
Status: COMPLETED | OUTPATIENT
Start: 2017-05-12 | End: 2017-05-14

## 2017-05-12 RX ORDER — FENTANYL CITRATE 50 UG/ML
25-100 INJECTION, SOLUTION INTRAMUSCULAR; INTRAVENOUS
Status: DISCONTINUED | OUTPATIENT
Start: 2017-05-12 | End: 2017-05-12

## 2017-05-12 RX ORDER — HEPARIN SODIUM (PORCINE) LOCK FLUSH IV SOLN 100 UNIT/ML 100 UNIT/ML
300 SOLUTION INTRAVENOUS EVERY 8 HOURS
Status: DISCONTINUED | OUTPATIENT
Start: 2017-05-12 | End: 2017-05-13

## 2017-05-12 RX ORDER — HEPARIN 100 UNIT/ML
SYRINGE INTRAVENOUS
Status: COMPLETED
Start: 2017-05-12 | End: 2017-05-12

## 2017-05-12 RX ORDER — HEPARIN SODIUM 200 [USP'U]/100ML
1000 INJECTION, SOLUTION INTRAVENOUS ONCE
Status: COMPLETED | OUTPATIENT
Start: 2017-05-12 | End: 2017-05-12

## 2017-05-12 RX ORDER — OXYCODONE AND ACETAMINOPHEN 5; 325 MG/1; MG/1
1 TABLET ORAL
Status: DISCONTINUED | OUTPATIENT
Start: 2017-05-12 | End: 2017-05-16 | Stop reason: HOSPADM

## 2017-05-12 RX ADMIN — OXYCODONE HYDROCHLORIDE AND ACETAMINOPHEN 1 TABLET: 5; 325 TABLET ORAL at 23:13

## 2017-05-12 RX ADMIN — SODIUM CHLORIDE 500 MG: 900 INJECTION, SOLUTION INTRAVENOUS at 04:34

## 2017-05-12 RX ADMIN — SODIUM CHLORIDE 100 ML/HR: 900 INJECTION, SOLUTION INTRAVENOUS at 08:13

## 2017-05-12 RX ADMIN — LIDOCAINE HYDROCHLORIDE 10 ML: 10 INJECTION, SOLUTION INFILTRATION; PERINEURAL at 11:00

## 2017-05-12 RX ADMIN — MULTIVITAMIN TABLET 1 TABLET: TABLET at 10:18

## 2017-05-12 RX ADMIN — MIDAZOLAM HYDROCHLORIDE 0.5 MG: 1 INJECTION, SOLUTION INTRAMUSCULAR; INTRAVENOUS at 13:07

## 2017-05-12 RX ADMIN — RAMIPRIL 10 MG: 5 CAPSULE ORAL at 10:18

## 2017-05-12 RX ADMIN — HEPARIN SODIUM 1000 UNITS: 200 INJECTION, SOLUTION INTRAVENOUS at 11:57

## 2017-05-12 RX ADMIN — FENTANYL CITRATE 25 MCG: 50 INJECTION, SOLUTION INTRAMUSCULAR; INTRAVENOUS at 14:09

## 2017-05-12 RX ADMIN — MIDAZOLAM HYDROCHLORIDE 0.5 MG: 1 INJECTION, SOLUTION INTRAMUSCULAR; INTRAVENOUS at 14:09

## 2017-05-12 RX ADMIN — IOPAMIDOL 115 ML: 612 INJECTION, SOLUTION INTRATHECAL at 11:57

## 2017-05-12 RX ADMIN — HEPARIN SODIUM 2000 UNITS: 200 INJECTION, SOLUTION INTRAVENOUS at 11:57

## 2017-05-12 RX ADMIN — SODIUM CHLORIDE 100 ML/HR: 900 INJECTION, SOLUTION INTRAVENOUS at 21:50

## 2017-05-12 RX ADMIN — FENTANYL CITRATE 25 MCG: 50 INJECTION, SOLUTION INTRAMUSCULAR; INTRAVENOUS at 13:08

## 2017-05-12 RX ADMIN — FENTANYL CITRATE 25 MCG: 50 INJECTION, SOLUTION INTRAMUSCULAR; INTRAVENOUS at 13:42

## 2017-05-12 RX ADMIN — MIDAZOLAM HYDROCHLORIDE 0.5 MG: 1 INJECTION, SOLUTION INTRAMUSCULAR; INTRAVENOUS at 13:42

## 2017-05-12 RX ADMIN — ATORVASTATIN CALCIUM 80 MG: 40 TABLET, FILM COATED ORAL at 10:18

## 2017-05-12 RX ADMIN — Medication 300 UNITS: at 23:13

## 2017-05-12 RX ADMIN — FENTANYL CITRATE 25 MCG: 50 INJECTION, SOLUTION INTRAMUSCULAR; INTRAVENOUS at 12:37

## 2017-05-12 RX ADMIN — MIDAZOLAM HYDROCHLORIDE 0.5 MG: 1 INJECTION, SOLUTION INTRAMUSCULAR; INTRAVENOUS at 12:37

## 2017-05-12 RX ADMIN — HEPARIN SODIUM 3000 UNITS: 1000 INJECTION, SOLUTION INTRAVENOUS; SUBCUTANEOUS at 14:21

## 2017-05-12 RX ADMIN — ESCITALOPRAM OXALATE 20 MG: 10 TABLET ORAL at 10:18

## 2017-05-12 RX ADMIN — CEFTRIAXONE 2 G: 2 INJECTION, POWDER, FOR SOLUTION INTRAMUSCULAR; INTRAVENOUS at 04:33

## 2017-05-12 NOTE — INTERVAL H&P NOTE
H&P Update:  Mariana Rush was seen and examined. History and physical has been reviewed. The patient has been examined.  There have been no significant clinical changes since the completion of the originally dated History and Physical.    Signed By: Sheryl Abdullahi MD     May 12, 2017 12:25 PM

## 2017-05-12 NOTE — PROGRESS NOTES
Progress Note      Patient: Mariana Rush               Sex: male          DOA: 5/9/2017       YOB: 1940      Age:  76 y.o.        LOS:  LOS: 2 days             CHIEF COMPLAINT: SVC syndrome in the setting of breast cancer history    Subjective:     Pt s/e at bedside  Offers no complaints  Plan for bronch today and venogram w/ stent  Denies CP or SOB    Objective:      Visit Vitals    /66 (BP 1 Location: Left arm, BP Patient Position: Supine)    Pulse 72    Temp 98.4 °F (36.9 °C)    Resp 20    Ht 6' 2\" (1.88 m)    Wt 83 kg (182 lb 15.7 oz)    SpO2 100%    BMI 23.49 kg/m2       Physical Exam:  General Appearance: NAD, conversant  Lungs: CTA with normal respiratory effort  CV: RRR, no m/r/g  Abdomen: soft, non-tender, normal bowel sounds  Extremities: no cyanosis, no peripheral edema  Psych: appropriate affect, alert and oriented to person, place and time    Lab/Data Reviewed:  BMP:   Lab Results   Component Value Date/Time     05/12/2017 04:30 AM    K 4.2 05/12/2017 04:30 AM     05/12/2017 04:30 AM    CO2 23 05/12/2017 04:30 AM    AGAP 10 05/12/2017 04:30 AM    GLU 85 05/12/2017 04:30 AM    BUN 21 (H) 05/12/2017 04:30 AM    CREA 0.91 05/12/2017 04:30 AM    GFRAA >60 05/12/2017 04:30 AM    GFRNA >60 05/12/2017 04:30 AM     CBC:   Lab Results   Component Value Date/Time    WBC 9.7 05/12/2017 04:30 AM    HGB 12.4 (L) 05/12/2017 04:30 AM    HCT 38.5 05/12/2017 04:30 AM     05/12/2017 04:30 AM           Assessment/Plan     Active Problems:    Dizziness (5/10/2017)      Pneumonia (5/10/2017)      SVC (superior vena cava obstruction) (5/10/2017)      LUIS (acute kidney injury) ([address] (5/10/2017)    Breast Cancer, previously treated with Tamoxifen  Remote history of Prostate Cancer  New Lymphadenopathy visible by CTA, likely causing SVC syndrome    Plan:  Plan for bronchoscopy today via pulm for tissue diagnosis  Scheduled for central venogram w/ possible stent and removal of LIJ mediport   Discussed diagnosis with patient and sister at the bedside  Disposition pending these procedures[    Gurdeep November, DO  Internal Medicine, Hospitalist  Pager: [phone]  [address] [place] [address] Group

## 2017-05-12 NOTE — PROGRESS NOTES
Vascular Surgery Progress Note    Admit Date: 2017    Subjective:   Pt seen earlier this afternoon - no difficulty swallowing, no dyspnea. No pain. Objective:     Visit Vitals    /58 (BP 1 Location: Left arm, BP Patient Position: Supine)    Pulse 64    Temp 97.9 °F (36.6 °C)    Resp 20    Ht 6' 2\" (1.88 m)    Wt 80.7 kg (178 lb)    SpO2 98%    BMI 22.85 kg/m2       Temp (24hrs), Av.9 °F (36.6 °C), Min:97.4 °F (36.3 °C), Max:98.3 °F (36.8 °C)      Physical Exam:  GEN: A&Ox3, NAD, comfortable. HEENT:PERRL EOMI, non icteric, moist membranes. Less facial and neck edema - less plethoric looking, sitting upright. NECK: no JVD, no LA, no TM  LUNG: clear b/l and unlabored breathing  HEART: regular  ABD: soft, NT, no masses palpable, no OM, NABS   EXT: 1-2+ upper arm edema L>>R.   PULSES:palpable radial and pedal pulses bilaterally. NEURO: no focal lateralizing deficits noted. Motor 5/5. Labs:   Recent Results (from the past 24 hour(s))   CBC WITH AUTOMATED DIFF    Collection Time: 17  5:45 AM   Result Value Ref Range    WBC 9.3 4.6 - 13.2 K/uL    RBC 4.29 (L) 4.70 - 5.50 M/uL    HGB 12.4 (L) 13.0 - 16.0 g/dL    HCT 39.0 36.0 - 48.0 %    MCV 90.9 74.0 - 97.0 FL    MCH 28.9 24.0 - 34.0 PG    MCHC 31.8 31.0 - 37.0 g/dL    RDW 14.7 (H) 11.6 - 14.5 %    PLATELET 179 468 - 406 K/uL    MPV 10.8 9.2 - 11.8 FL    NEUTROPHILS 71 40 - 73 %    LYMPHOCYTES 21 21 - 52 %    MONOCYTES 7 3 - 10 %    EOSINOPHILS 1 0 - 5 %    BASOPHILS 0 0 - 2 %    ABS. NEUTROPHILS 6.6 1.8 - 8.0 K/UL    ABS. LYMPHOCYTES 1.9 0.9 - 3.6 K/UL    ABS. MONOCYTES 0.7 0.05 - 1.2 K/UL    ABS. EOSINOPHILS 0.1 0.0 - 0.4 K/UL    ABS.  BASOPHILS 0.0 0.0 - 0.06 K/UL    DF AUTOMATED     PROTHROMBIN TIME + INR    Collection Time: 17  5:45 AM   Result Value Ref Range    Prothrombin time 14.5 11.5 - 15.2 sec    INR 1.2 0.8 - 1.2     PTT    Collection Time: 17  5:45 AM   Result Value Ref Range    aPTT 68.7 (H) 23.0 - 36.4 SEC GLUCOSE, POC    Collection Time: 05/11/17  8:06 AM   Result Value Ref Range    Glucose (POC) 75 70 - 110 mg/dL   GLUCOSE, POC    Collection Time: 05/11/17 11:53 AM   Result Value Ref Range    Glucose (POC) 141 (H) 70 - 110 mg/dL   PTT    Collection Time: 05/11/17  4:15 PM   Result Value Ref Range    aPTT 109.6 (H) 23.0 - 36.4 SEC   GLUCOSE, POC    Collection Time: 05/11/17  5:07 PM   Result Value Ref Range    Glucose (POC) 89 70 - 110 mg/dL   PTT    Collection Time: 05/11/17 10:20 PM   Result Value Ref Range    aPTT 75.9 (H) 23.0 - 36.4 SEC   GLUCOSE, POC    Collection Time: 05/11/17 10:24 PM   Result Value Ref Range    Glucose (POC) 98 70 - 110 mg/dL       Assessment:     Active Problems:    Dizziness (5/10/2017)      Pneumonia (5/10/2017)      SVC (superior vena cava obstruction) (5/10/2017)      LUIS (acute kidney injury) ([facility] [address] (5/10/2017)        Plan/Recommendations/Medical Decision Making:   Pt to attempt to have bronchoscopy tomorrow am if possible  Scheduled for central venogram, possible stent, possible removal L IJ Mediport. Continue heparin tonight for very sluggish venous flow in both arms. Solitario Alvarenga.  Berna Silva, [address] [facility]  Cngw - [phone]  May 11, 2017  11:20 PM

## 2017-05-12 NOTE — PROGRESS NOTES
[facility]:  Pre Procedure Chart Check    Patients chart was accessed and reviewed for possible and/or scheduled procedure. Creatinine Clearance:  CREATININE: 0.91 MG/DL (05/12/17 0430)  Estimated creatinine clearance: 76.5 mL/min    Total Contrast  Load:  3 x estimated clearance amount=   229.5    ml    75% of Contrast Load:  0.75 x Total Contrast Load=    172    ml      Recent Labs      05/12/17   0430   05/11/17   0545   05/10/17   0030   WBC  9.7   --   9.3   --   12.3   RBC  4.27*   --   4.29*   --   4.78   HCT  38.5   --   39.0   --   42.5   HGB  12.4*   --   12.4*   --   14.2   PLT  246   --   250   --   237   INR   --    --   1.2   --    --    APTT  97.6*   < >  68.7*   < >  26.8   PTP   --    --   14.5   --    --    NA  140   --    --    < >  141   K  4.2   --    --    < >  4.2   BUN  21*   --    --    < >  28*   CREA  0.91   --    --    < >  1.42*   GFRAA  >60   --    --    < >  58*   GFRNA  >60   --    --    < >  48*   CA  8.0*   --    --    < >  8.4*   TROIQ   --    --    --    --   <0.02    < > = values in this interval not displayed. BMI: Body mass index is 23.49 kg/(m^2).     ALLERGIES:   Allergies   Allergen Reactions    Chantix [Varenicline] Other (comments)    Effexor [Venlafaxine] Other (comments)       Lines:        Peripheral IV 05/10/17 Right Antecubital (Active)   Site Assessment Clean, dry, & intact 5/11/2017  8:05 PM   Phlebitis Assessment 0 5/11/2017  8:05 PM   Infiltration Assessment 0 5/11/2017  8:05 PM   Dressing Status Clean, dry, & intact 5/11/2017  8:05 PM   Dressing Type Transparent 5/11/2017  8:05 PM   Hub Color/Line Status Green 5/11/2017  8:05 PM   Action Taken Open ports on tubing capped 5/11/2017  8:05 PM   Alcohol Cap Used Yes 5/11/2017  8:05 PM       Peripheral IV 05/10/17 Left Antecubital (Active)   Site Assessment Clean, dry, & intact 5/11/2017  8:05 PM   Phlebitis Assessment 0 5/11/2017  8:05 PM   Infiltration Assessment 0 5/11/2017  8:05 PM Dressing Status Clean, dry, & intact 5/11/2017  8:05 PM   Dressing Type Transparent 5/11/2017  8:05 PM   Hub Color/Line Status Green 5/11/2017  8:05 PM   Action Taken Open ports on tubing capped 5/11/2017  8:05 PM   Alcohol Cap Used Yes 5/11/2017  8:05 PM       Peripheral IV 05/10/17 Left Hand (Active)   Site Assessment Clean, dry, & intact 5/11/2017  8:05 PM   Phlebitis Assessment 0 5/11/2017  8:05 PM   Infiltration Assessment 0 5/11/2017  8:05 PM   Dressing Status Clean, dry, & intact 5/11/2017  8:05 PM   Dressing Type Transparent 5/11/2017  8:05 PM   Hub Color/Line Status Pink 5/11/2017  8:05 PM   Action Taken Open ports on tubing capped 5/11/2017  8:05 PM   Alcohol Cap Used Yes 5/11/2017  8:05 PM          History:  Past Medical History:   Diagnosis Date    Atherosclerosis of abdominal aorta ([address]     CAD, multiple vessel     Essential hypertension, benign     High cholesterol     Hypercholesteremia     Hypertension     Major depressive disorder, recurrent episode ([address]     Malignant neoplasm of nipple of right male breast ([address]     Osteopenia     Prostate cancer ([address]     Renal stones     Vitamin D deficiency      Past Surgical History:   Procedure Laterality Date    HX CORONARY ARTERY BYPASS GRAFT       Patient Active Problem List   Diagnosis Code    Dizziness R42    Pneumonia J18.9    SVC (superior vena cava obstruction) I87.1    LUIS (acute kidney injury) ([address] N17.9

## 2017-05-12 NOTE — PROGRESS NOTES
Appreciate notification of Mr Hernandez admission. He is a patient of mine with Er pos, HER 2 pos breast cancer, treated with surgery and adjuvant herceptin based chemotherapy. He has not followed up in clinic for approximately 10 months and is now admitted with SVC syndrome with evidence of thrombus and new mediastinal adenopathy. Recommendations    1/ Agree with anticoagulation with heparin  2/ Reasonable to remove mediport  3/ Will need tissue dx to confirm met breast cancer and to rule out new primary malignancy    If this is met breast cancer, multiple palliative therapeutic measures are available to Mr Loya and median survival approaches five years if this is a recurrence of HER 2 positive disease. I will follow when path available.  Please call me directly on my cell phone with questions    Cosmo Mcelroy MD  Cell [phone]

## 2017-05-12 NOTE — ROUTINE PROCESS
TRANSFER - OUT REPORT:    Verbal report given to [facility] on Mamta Gómez  being transferred to [facility] for routine progression of care       Report consisted of patients Situation, Background, Assessment and   Recommendations(SBAR). Information from the following report(s) SBAR was reviewed with the receiving nurse.     Lines:   Venous Access Device 05/12/17 Upper chest (subclavicular area), left (Active)   Central Line Being Utilized Yes 5/12/2017  2:10 PM   Criteria for Appropriate Use Irritant/vesicant medication 5/12/2017  2:10 PM   Site Assessment Clean, dry, & intact 5/12/2017  2:10 PM       Peripheral IV 05/10/17 Right Antecubital (Active)   Site Assessment Clean, dry, & intact 5/11/2017  8:05 PM   Phlebitis Assessment 0 5/11/2017  8:05 PM   Infiltration Assessment 0 5/11/2017  8:05 PM   Dressing Status Clean, dry, & intact 5/11/2017  8:05 PM   Dressing Type Transparent 5/11/2017  8:05 PM   Hub Color/Line Status Green 5/11/2017  8:05 PM   Action Taken Open ports on tubing capped 5/11/2017  8:05 PM   Alcohol Cap Used Yes 5/11/2017  8:05 PM       Peripheral IV 05/10/17 Left Antecubital (Active)   Site Assessment Clean, dry, & intact 5/11/2017  8:05 PM   Phlebitis Assessment 0 5/11/2017  8:05 PM   Infiltration Assessment 0 5/11/2017  8:05 PM   Dressing Status Clean, dry, & intact 5/11/2017  8:05 PM   Dressing Type Transparent 5/11/2017  8:05 PM   Hub Color/Line Status Green 5/11/2017  8:05 PM   Action Taken Open ports on tubing capped 5/11/2017  8:05 PM   Alcohol Cap Used Yes 5/11/2017  8:05 PM       Peripheral IV 05/10/17 Left Hand (Active)   Site Assessment Clean, dry, & intact 5/11/2017  8:05 PM   Phlebitis Assessment 0 5/11/2017  8:05 PM   Infiltration Assessment 0 5/11/2017  8:05 PM   Dressing Status Clean, dry, & intact 5/11/2017  8:05 PM   Dressing Type Transparent 5/11/2017  8:05 PM   Hub Color/Line Status Pink 5/11/2017  8:05 PM   Action Taken Open ports on tubing capped 5/11/2017  8:05 PM   Alcohol Cap Used Yes 5/11/2017  8:05 PM        Opportunity for questions and clarification was provided.       Patient transported with:   [employer]

## 2017-05-12 NOTE — PROGRESS NOTES
0805 Bedside and Verbal shift change report given to Antonia Brumfield RN (oncoming nurse) by Ericka Felton RN (offgoing nurse). Report included the following information SBAR, Procedure Summary, Intake/Output, MAR and Recent Results. Pt resting in bed, NAD. No c/o pain. Bed locked and lowered, siderails up x3. Call bell at bedside, will continue to monitor. 2236 Pt resting in bed, Sister at bedside, assessment completed, meds provided. Consent signed for venogram/possible intervention. Will continue to monitor. 1015 Heparin drip stopped due to verbal order from Dr. Katarina Palmer.     1100 Pt off floor for procedure. 1430 Pt returned to floor. Pt alert and stable. 1600 Dr Katarina Palmer paged for order to access Mediport, waiting for call back. 80 Dr Latisha Hunter paged again waiting for call back. [address] called for fosamax med, never stocked in [email or web address], pharm tech stated they will \"get it ready\"    1930 Bedside and Verbal shift change report given to Mariela Mead RN (oncoming nurse) by Dieter Dominguez RN   (offgoing nurse). Report included the following information SBAR, Procedure Summary, Intake/Output, MAR and Recent Results. Oncoming RN aware that mediport needs to be accessed however unable to get orders to access.

## 2017-05-12 NOTE — PROGRESS NOTES
PCCM  Fri 5/12/2017    I saw pt last night. He is A&A, delightful, Ox3, no distress. No headache.   + cyanotic congested appearance of head and neck,  SCF are full but compressible bilat. No palpable neck SAM. Lungs clear  No LE edema or cyanosis    Discussed risks of TBNA under GETA to be esentially just bleeding that would be unusual to ever be severe. He wishes to proceed. IMPRESSION:  3. 75 yo man with remarkable compensation for near total occlusion of SVC. He has been having dizziness, probably due to sluggish vertebral flow secondary to high venous resistance. * Vascular surgeons are going to place an SVC stent and attempt to salvage his mediport. * he is on heparin due to sluggish venous flow    2. Endo suite unable to do the TBNA this AM due to last minute scheduling. I can do it any time over next three days if others are amenable. Otherwise will need to be next week. We can see how he is doing after vascular surgery. Needs GETA & pathologist on site due to potentially repetitive nature of TBNA sampling. PLAN:  Will check back later. Call me if want to proceed today. Am in ICU.    Discussed with Endo suite and Dr Richard Medina  Needs heparin off x2h for TBNA if we do it today.    -taty  [phone]

## 2017-05-12 NOTE — H&P (VIEW-ONLY)
Vascular Surgery Consultation  [facility]    Subjective:   75 y/o male admitted early this a.m. With dizziness, with edema of face/ neck with cyanotic appearing skin. CT revealed occluded SVC with bulky mediastinal lymph nodes. Pt has h/o breast CA and prostate Ca. Since admission - sitting at least 45 degrees with heparin infusion- he feels better. No dyspnea or difficulty swallowing. Pt has L IJ mediport in place several years. Patient Active Problem List    Diagnosis Date Noted    Dizziness 05/10/2017    Pneumonia 05/10/2017    SVC (superior vena cava obstruction) 05/10/2017    LUIS (acute [address]) ([facility] 05/10/2017     Past Medical History:   Diagnosis Date    Atherosclerosis of abdominal aorta (HCC)     CAD, multiple vessel     Essential hypertension, benign     High cholesterol     Hypercholesteremia     Hypertension     Major depressive disorder, recurrent episode ([address]     Malignant neoplasm of nipple of right male breast ([address]     Osteopenia     Prostate cancer ([address]     Renal stones     Vitamin D deficiency       Past Surgical History:   Procedure Laterality Date    HX CORONARY ARTERY BYPASS GRAFT        Social History   Substance Use Topics    Smoking status: Not on file    Smokeless tobacco: Not on file    Alcohol use Not on file      History reviewed. No pertinent family history. Prior to Admission medications    Medication Sig Start Date End Date Taking? Authorizing Provider   albuterol (PROVENTIL HFA, VENTOLIN HFA, PROAIR HFA) 90 mcg/actuation inhaler Take  by inhalation. Yes Pura Sousa MD   alendronate (FOSAMAX) 35 mg tablet Take 35 mg by mouth every seven (7) days. Yes Pura Sousa MD   aspirin 81 mg chewable tablet Take 81 mg by mouth daily. Yes Pura Sousa MD   atorvastatin (LIPITOR) 80 mg tablet Take 80 mg by mouth daily. Yes Pura Sousa MD   escitalopram oxalate (LEXAPRO) 20 mg tablet Take 20 mg by mouth daily.    Yes Pura Sousa MD multivitamin (ONE A DAY) tablet Take 1 Tab by mouth daily. Yes Pura Sousa MD   polyethylene glycol (MIRALAX) 17 gram packet Take 17 g by mouth daily. Yes Pura Sousa MD   ramipril (ALTACE) 10 mg capsule Take 10 mg by mouth daily. Yes Pura Sousa MD   tamoxifen (NOLVADEX) 20 mg tablet Take 20 mg by mouth daily. Yes Pura Suosa MD   topiramate (TOPAMAX) 50 mg tablet Take 150 mg by mouth nightly. Yes Pura Sousa MD   vardenafil (LEVITRA) 20 mg tablet Take 5 mg by mouth as needed. Yes Pura Sousa MD   sildenafil citrate (VIAGRA) 100 mg tablet Take 1 Tab by mouth daily as needed for 4 doses. 12   Surekha Chase MD     Allergies   Allergen Reactions    Chantix [Varenicline] Other (comments)    Effexor [Venlafaxine] Other (comments)         Review of Systems:   Gen -no  fever / chills,  No weight changeNormal appetite. HEENT - denies vision changes, no dysphagia  PULM - no cough/dyspnea  CV - denies chest pain, palpitations or cardiac problems   GI - no abd pain, no n/v, no diarrhea/constipation, no blood per rectum   - denies dysuria/hematuria/frequency  SKIN - no ulcers or dermatitits  MS - no joint pain or arthritis. NEURO - denies prior stroke or TIA, no seizure history, no significant headaches, no focal weakness. +dizziness. Objective:     Visit Vitals    /69 (BP 1 Location: Right arm, BP Patient Position: Supine)    Pulse 71    Temp 97.4 °F (36.3 °C)    Resp 20    Ht 6' 2\" (1.88 m)    Wt 80.7 kg (178 lb)    SpO2 96%    BMI 22.85 kg/m2       Temp (24hrs), Av.3 °F (36.3 °C), Min:96.3 °F (35.7 °C), Max:97.8 °F (36.6 °C)      Physical Exam:  GEN: A&Ox3, NAD, ill appearing  HEENT:PERRL EOMI, non icteric, moist membranes. Face / neck edema mild with bluish / cyanotic discoloration  of the skin to shoulders. Although the patient reports it has improved since starting heparin.    NECK: nontender, no JVD, no carotid bruit, no LA, no TM  LUNG: clear b/l and unlabored breathing  HEART: regular   ABD: soft, NT, NABS   EXT: no arm edema, no LE edema. PULSES: palpable radial and pedal pulses. NEURO: no focal lateralizing deficits noted. Motor 5/5. Labs:   Recent Results (from the past 24 hour(s))   EKG, 12 LEAD, INITIAL    Collection Time: 05/10/17 12:26 AM   Result Value Ref Range    Ventricular Rate 79 BPM    Atrial Rate 79 BPM    P-R Interval 150 ms    QRS Duration 78 ms    Q-T Interval 392 ms    QTC Calculation (Bezet) 449 ms    Calculated P Axis 76 degrees    Calculated R Axis 19 degrees    Calculated T Axis 104 degrees    Diagnosis       Normal sinus rhythm  Technically poor tracing  No previous ECGs available  Confirmed by [employer] ([id number]) on 5/10/2017 8:02:41 AM     CBC WITH AUTOMATED DIFF    Collection Time: 05/10/17 12:30 AM   Result Value Ref Range    WBC 12.3 4.6 - 13.2 K/uL    RBC 4.78 4.70 - 5.50 M/uL    HGB 14.2 13.0 - 16.0 g/dL    HCT 42.5 36.0 - 48.0 %    MCV 88.9 74.0 - 97.0 FL    MCH 29.7 24.0 - 34.0 PG    MCHC 33.4 31.0 - 37.0 g/dL    RDW 14.5 11.6 - 14.5 %    PLATELET 579 222 - 464 K/uL    MPV 10.4 9.2 - 11.8 FL    NEUTROPHILS 77 (H) 40 - 73 %    LYMPHOCYTES 13 (L) 21 - 52 %    MONOCYTES 9 3 - 10 %    EOSINOPHILS 1 0 - 5 %    BASOPHILS 0 0 - 2 %    ABS. NEUTROPHILS 9.5 (H) 1.8 - 8.0 K/UL    ABS. LYMPHOCYTES 1.6 0.9 - 3.6 K/UL    ABS. MONOCYTES 1.1 0.05 - 1.2 K/UL    ABS. EOSINOPHILS 0.1 0.0 - 0.4 K/UL    ABS.  BASOPHILS 0.0 0.0 - 0.06 K/UL    DF AUTOMATED     METABOLIC PANEL, COMPREHENSIVE    Collection Time: 05/10/17 12:30 AM   Result Value Ref Range    Sodium 141 136 - 145 mmol/L    Potassium 4.2 3.5 - 5.5 mmol/L    Chloride 108 100 - 108 mmol/L    CO2 27 21 - 32 mmol/L    Anion gap 6 3.0 - 18 mmol/L    Glucose 129 (H) 74 - 99 mg/dL    BUN 28 (H) 7.0 - 18 MG/DL    Creatinine 1.42 (H) 0.6 - 1.3 MG/DL    BUN/Creatinine ratio 20 12 - 20      GFR est AA 58 (L) >60 ml/min/1.73m2    GFR est non-AA 48 (L) >60 ml/min/1.73m2    Calcium 8.4 (L) 8.5 - 10.1 MG/DL    Bilirubin, total 0.5 0.2 - 1.0 MG/DL    ALT (SGPT) 41 16 - 61 U/L    AST (SGOT) 41 (H) 15 - 37 U/L    Alk.  phosphatase 57 45 - 117 U/L    Protein, total 6.6 6.4 - 8.2 g/dL    Albumin 3.5 3.4 - 5.0 g/dL    Globulin 3.1 2.0 - 4.0 g/dL    A-G Ratio 1.1 0.8 - 1.7     TROPONIN I    Collection Time: 05/10/17 12:30 AM   Result Value Ref Range    Troponin-I, Qt. <0.02 0.0 - 0.045 NG/ML   D DIMER    Collection Time: 05/10/17 12:30 AM   Result Value Ref Range    D DIMER 2.21 (H) <0.46 ug/ml(FEU)   PTT    Collection Time: 05/10/17 12:30 AM   Result Value Ref Range    aPTT 26.8 23.0 - 36.4 SEC   CULTURE, BLOOD    Collection Time: 05/10/17  3:43 AM   Result Value Ref Range    Special Requests: NO SPECIAL REQUESTS      Culture result: NO GROWTH AFTER 1 HOUR     CULTURE, BLOOD    Collection Time: 05/10/17  3:53 AM   Result Value Ref Range    Special Requests: NO SPECIAL REQUESTS      Culture result: NO GROWTH AFTER 1 HOUR     METABOLIC PANEL, BASIC    Collection Time: 05/10/17  6:40 AM   Result Value Ref Range    Sodium 141 136 - 145 mmol/L    Potassium 4.4 3.5 - 5.5 mmol/L    Chloride 110 (H) 100 - 108 mmol/L    CO2 23 21 - 32 mmol/L    Anion gap 8 3.0 - 18 mmol/L    Glucose 103 (H) 74 - 99 mg/dL    BUN 25 (H) 7.0 - 18 MG/DL    Creatinine 1.25 0.6 - 1.3 MG/DL    BUN/Creatinine ratio 20 12 - 20      GFR est AA >60 >60 ml/min/1.73m2    GFR est non-AA 56 (L) >60 ml/min/1.73m2    Calcium 8.0 (L) 8.5 - 10.1 MG/DL   PTT    Collection Time: 05/10/17 10:15 AM   Result Value Ref Range    aPTT 144.3 (H) 23.0 - 36.4 SEC   GLUCOSE, POC    Collection Time: 05/10/17 12:48 PM   Result Value Ref Range    Glucose (POC) 87 70 - 110 mg/dL   PTT    Collection Time: 05/10/17  3:34 PM   Result Value Ref Range    aPTT 57.1 (H) 23.0 - 36.4 SEC   PTT    Collection Time: 05/10/17  9:28 PM   Result Value Ref Range    aPTT >180.0 (HH) 23.0 - 36.4 SEC   GLUCOSE, POC    Collection Time: 05/10/17 10:47 PM   Result Value Ref Range    Glucose (POC) 95 70 - 110 mg/dL     CTA chest; 5/10/17: 1. Nondiagnostic for pulmonary embolism, due to occluded SVC. Etiology of the thrombus is unknown and may be from the left Mediport or malignant thrombus. 2. Significant low-density mediastinal and right hilar adenopathy concerning for necrotic metastatic disease, infection i  3. Left axillary edema, likely from the occluded SVC. 4.RUL airspace disease concerning for pneumonia, subtle lareas of tree-in-bud density in the RLL and CARRIE/lingula, likely inflammatory/infectious. 5. Indeterminate left adrenal gland nodule. Tiny hepatic hypodensities    Bilateral UE venous duplex 5/10/17: no UE DVT, bilateral IJ /Subclavian veins with sluggish flow but no DVT seen. Assessment:     Active Problems:    Dizziness (5/10/2017)      Pneumonia (5/10/2017)      SVC (superior vena cava obstruction) (5/10/2017)      LUIS (acute kidney injury) ([facility] [address] (5/10/2017)        Plan:   Continues heparin and head/chest elevation  Plan central chest venogram with possible balloon expandable stent placement Friday. 5/12. Discussed with pt and family. Karla Sanchez.  Mitchell Ryan, [address] [facility]  cell - [phone]  May 10, 2017  11:38 PM

## 2017-05-12 NOTE — OP NOTES
[facility]ett    Name:  Judith Santo  MR#:  [id number]  :  1940  Account #:  [de-identified]  Date of Adm:  2017  Date of Surgery:  2017      PREOPERATIVE DIAGNOSIS: Superior vena cava syndrome. POSTOPERATIVE DIAGNOSIS: Superior vena cava syndrome. PROCEDURES PERFORMED  1. Superior vena cava venogram.  2. Brachiocephalic stenting with a 10 x 60 self-expanding stent. 3. Superior vena cava stenting with two 12 x 40 self-expanding stents,  followed by a 10 x 27 balloon expandable stent. 4. Intravascular ultrasound, brachiocephalic, superior vena cava. 5. Replacement of MediPort. SURGEON: Miguel Hill MD    ESTIMATED BLOOD LOSS: none    SPECIMENS REMOVED: none    ANESTHESIA: Moderate sedation. INDICATIONS: This is a 70-year-old gentleman with bulky mediastinal  disease. The patient was noted to have superior vena cava syndrome. The patient scheduled for venogram, possible intervention. PROCEDURE: After informed consent was obtained, the patient was  brought to the angio suite, placed in the supine position. The left neck  and chest were prepped and draped in the usual sterile fashion. After  adequate local anesthesia, a timeout was performed. After adequate  local anesthesia, a small incision was made and the MediPort was  exposed. The MediPort was then slowly removed until the catheter  was visible. A hemostat was placed. A Glidewire was placed into the  catheter. The Glidewire was advanced into the inferior vena cava. The  old catheter was removed. Next, a tapered Glidewire was placed over  the wire. The Glidewire was exchanged for an Amplatz. Next, an 8 x 23  sheath was placed over the wire. A venogram was performed, which  showed that the brachiocephalic and superior vena cava were  completely occluded. Next, intravascular ultrasound was used to identify where there was a  normal-appearing vena cava versus the brachiocephalic vein. Several  stones were required. Two 12 x 40 stents were placed, followed by a  10 x 27 balloon expandable stent. This essentially stented the entire  brachiocephalic and superior vena cava. A fourth stent was required  extending into the brachiocephalic in order to have good flow  throughout the brachiocephalic and superior vena cava. Once this was  done, a new MediPort was placed. The new MediPort catheter was  placed over the Amplatz wire. The Amplatz wire was removed and a  Glidewire was placed. The catheter tip was cut to 35 cm. The Glidewire  was removed, the new MediPort was then placed into the  subcutaneous pocket. The pocket was then closed in 2 layers. Deep  tissues were reapproximated with 3-0 Vicryl in interrupted fashion. Skin  was closed with 4-0 Monocryl in a running subcuticular fashion. Dermabond was applied. The catheter flushed and aspirated easily. The catheter was then filled  with 3 mL of 1000 units per cc of heparin. The patient tolerated the  procedure well. There were no complications.         MD Courtney Cobb / Raimundo Whitley  D:  05/12/2017   15:11  T:  05/12/2017   19:09  Job #:  [id number]

## 2017-05-12 NOTE — PERIOP NOTES
After speaking with Dr. Linh Ling in regards to this patient's operative procedures she felt that in the patient's physical/medical best interest that the second procedure be done on another day. The patient has also been on a heparin drip for DVT prophylaxis. Dr. Lorenzo Hatch and the patient's nurse Hortencia Rae have been made aware of anesthesia's decision.

## 2017-05-12 NOTE — PROCEDURES
Brief Op Note  5/12/2017    Hospital:  [facility]    Indications: SVC Syndrome  Procedure Details:    Venogram     Surgeon: Keysha Brar MD  Findings: Occlusion of the BVC and SVC underwent primary stenting with good results. Estimated Blood Loss: none  Drains: none   Total IV Fluids: 100 cc  Heparin: 7078 units  Complications: none     BARBARA Miguel MD, FACS

## 2017-05-13 LAB
ANION GAP BLD CALC-SCNC: 11 MMOL/L (ref 3–18)
BASOPHILS # BLD AUTO: 0 K/UL (ref 0–0.06)
BASOPHILS # BLD: 0 % (ref 0–2)
BUN SERPL-MCNC: 14 MG/DL (ref 7–18)
BUN/CREAT SERPL: 19 (ref 12–20)
CALCIUM SERPL-MCNC: 7.6 MG/DL (ref 8.5–10.1)
CHLORIDE SERPL-SCNC: 111 MMOL/L (ref 100–108)
CO2 SERPL-SCNC: 20 MMOL/L (ref 21–32)
CREAT SERPL-MCNC: 0.72 MG/DL (ref 0.6–1.3)
DIFFERENTIAL METHOD BLD: ABNORMAL
EOSINOPHIL # BLD: 0.1 K/UL (ref 0–0.4)
EOSINOPHIL NFR BLD: 1 % (ref 0–5)
ERYTHROCYTE [DISTWIDTH] IN BLOOD BY AUTOMATED COUNT: 14.3 % (ref 11.6–14.5)
GLUCOSE BLD STRIP.AUTO-MCNC: 122 MG/DL (ref 70–110)
GLUCOSE BLD STRIP.AUTO-MCNC: 92 MG/DL (ref 70–110)
GLUCOSE BLD STRIP.AUTO-MCNC: 98 MG/DL (ref 70–110)
GLUCOSE BLD STRIP.AUTO-MCNC: 99 MG/DL (ref 70–110)
GLUCOSE SERPL-MCNC: 87 MG/DL (ref 74–99)
HCT VFR BLD AUTO: 36.8 % (ref 36–48)
HGB BLD-MCNC: 12.1 G/DL (ref 13–16)
LYMPHOCYTES # BLD AUTO: 15 % (ref 21–52)
LYMPHOCYTES # BLD: 1.5 K/UL (ref 0.9–3.6)
MCH RBC QN AUTO: 29 PG (ref 24–34)
MCHC RBC AUTO-ENTMCNC: 32.9 G/DL (ref 31–37)
MCV RBC AUTO: 88.2 FL (ref 74–97)
MONOCYTES # BLD: 1.1 K/UL (ref 0.05–1.2)
MONOCYTES NFR BLD AUTO: 12 % (ref 3–10)
NEUTS SEG # BLD: 7 K/UL (ref 1.8–8)
NEUTS SEG NFR BLD AUTO: 72 % (ref 40–73)
PLATELET # BLD AUTO: 204 K/UL (ref 135–420)
PMV BLD AUTO: 10.5 FL (ref 9.2–11.8)
POTASSIUM SERPL-SCNC: 4.4 MMOL/L (ref 3.5–5.5)
RBC # BLD AUTO: 4.17 M/UL (ref 4.7–5.5)
SODIUM SERPL-SCNC: 142 MMOL/L (ref 136–145)
WBC # BLD AUTO: 9.7 K/UL (ref 4.6–13.2)

## 2017-05-13 PROCEDURE — 85025 COMPLETE CBC W/AUTO DIFF WBC: CPT | Performed by: HOSPITALIST

## 2017-05-13 PROCEDURE — 74011000258 HC RX REV CODE- 258: Performed by: EMERGENCY MEDICINE

## 2017-05-13 PROCEDURE — 74011250636 HC RX REV CODE- 250/636

## 2017-05-13 PROCEDURE — 74011250636 HC RX REV CODE- 250/636: Performed by: FAMILY MEDICINE

## 2017-05-13 PROCEDURE — 82962 GLUCOSE BLOOD TEST: CPT

## 2017-05-13 PROCEDURE — 65660000000 HC RM CCU STEPDOWN

## 2017-05-13 PROCEDURE — 74011250637 HC RX REV CODE- 250/637: Performed by: FAMILY MEDICINE

## 2017-05-13 PROCEDURE — 74011250636 HC RX REV CODE- 250/636: Performed by: HOSPITALIST

## 2017-05-13 PROCEDURE — 80048 BASIC METABOLIC PNL TOTAL CA: CPT | Performed by: HOSPITALIST

## 2017-05-13 PROCEDURE — 74011250636 HC RX REV CODE- 250/636: Performed by: EMERGENCY MEDICINE

## 2017-05-13 PROCEDURE — 36415 COLL VENOUS BLD VENIPUNCTURE: CPT | Performed by: HOSPITALIST

## 2017-05-13 PROCEDURE — 77030020263 HC SOL INJ SOD CL0.9% LFCR 1000ML

## 2017-05-13 RX ORDER — HEPARIN 100 UNIT/ML
SYRINGE INTRAVENOUS
Status: COMPLETED
Start: 2017-05-13 | End: 2017-05-13

## 2017-05-13 RX ORDER — CLOPIDOGREL BISULFATE 75 MG/1
75 TABLET ORAL DAILY
Status: DISCONTINUED | OUTPATIENT
Start: 2017-05-13 | End: 2017-05-13

## 2017-05-13 RX ORDER — HEPARIN 100 UNIT/ML
300 SYRINGE INTRAVENOUS EVERY 8 HOURS
Status: DISCONTINUED | OUTPATIENT
Start: 2017-05-13 | End: 2017-05-16 | Stop reason: HOSPADM

## 2017-05-13 RX ADMIN — SODIUM CHLORIDE 100 ML/HR: 900 INJECTION, SOLUTION INTRAVENOUS at 10:07

## 2017-05-13 RX ADMIN — MULTIVITAMIN TABLET 1 TABLET: TABLET at 10:00

## 2017-05-13 RX ADMIN — CEFTRIAXONE 2 G: 2 INJECTION, POWDER, FOR SOLUTION INTRAMUSCULAR; INTRAVENOUS at 03:10

## 2017-05-13 RX ADMIN — Medication 300 UNITS: at 21:59

## 2017-05-13 RX ADMIN — TAMOXIFEN CITRATE 20 MG: 10 TABLET ORAL at 12:00

## 2017-05-13 RX ADMIN — ATORVASTATIN CALCIUM 80 MG: 40 TABLET, FILM COATED ORAL at 10:00

## 2017-05-13 RX ADMIN — ESCITALOPRAM OXALATE 20 MG: 10 TABLET ORAL at 10:00

## 2017-05-13 RX ADMIN — SODIUM CHLORIDE 100 ML/HR: 900 INJECTION, SOLUTION INTRAVENOUS at 20:15

## 2017-05-13 RX ADMIN — SODIUM CHLORIDE 500 MG: 900 INJECTION, SOLUTION INTRAVENOUS at 03:55

## 2017-05-13 RX ADMIN — RAMIPRIL 10 MG: 5 CAPSULE ORAL at 10:00

## 2017-05-13 RX ADMIN — Medication 300 UNITS: at 06:22

## 2017-05-13 NOTE — ROUTINE PROCESS
1920: Assumed care. A&Ox4. Behavior appropriate to situations. Assessment done. Denies any pain or discomfort at this time. Call light within reach. 2224: Paged Dr. Dudley Maxwell. Order obtained to access mediport for Heparin flushes. [phone]: Mediport accessed by Beyn Patel ICU nurse. Aseptic technique observed. 2242: Patient c/o right arm pain. Dr. Ashlee Dumont on floor. Informed. Order obtained. 2313: Due meds given. Medicated for pain per patient request. Will continue to monitor. 0030: Resting quietly. 0144: No change from previous assessment. 0310: Due med given. 0355: Due med given.  3226: No change from previous assessment.  0622: Slept good thru night. Needs attended. Due med given. 1440: Bedside and Verbal shift change report given to Ashanti Keating RN (oncoming nurse) by Elida Okeefe RN (offgoing nurse). Report included the following information SBAR, Kardex, Intake/Output, MAR and Recent Results.

## 2017-05-13 NOTE — PROGRESS NOTES
Patient seen, chart reviewed, events noted. Appreciate help of Pulmonary service. Agree with plans for TBNA for diagnostic purposes. This will guide further therapy. Blood pressure 105/61, pulse 80, temperature 99.1 °F (37.3 °C), resp. rate 18, height 6' 2\" (1.88 m), weight 81.2 kg (179 lb), SpO2 91 %. Lab reviewed. Will await path results. Dr. Albania Schultz will follow with further recommendations. Discussed with patient and sister at bedside.

## 2017-05-13 NOTE — PROGRESS NOTES
[facility]  [address]  [address] [place], [address], [address] [place] [address]  Dr. Rosa Guerrero, Dr. Migdalia Murrieta  [phone] FAX# [phone]    Progress Note    Patient: Robi Burns MRN: [id number]  SSN: [id number]    YOB: 1940  Age: 76 y.o. Sex: male      Admit Date: 5/9/2017    LOS: 3 days     Subjective:     No new complaints. Mediport working well. Pt denies facial swelling or discomfort.     Objective:     Vitals:    05/12/17 1438 05/12/17 2236 05/13/17 0144 05/13/17 0609   BP: 140/70 149/77 117/71 126/84   Pulse: 90 85 80 80   Resp: 17 18 18 18   Temp: 98.1 °F (36.7 °C) 99 °F (37.2 °C) 98.2 °F (36.8 °C) 99 °F (37.2 °C)   SpO2: 94% 92% 91% 91%   Weight:    81.2 kg (179 lb)   Height:            Intake and Output:  Current Shift:    Last three shifts: 05/11 1901 - 05/13 0700  In: 3868.8 [P.O.:960; I.V.:2908.8]  Out: 1752 [Urine:1750]    Physical Exam:   GENERAL: alert, cooperative, no distress, appears stated age  THROAT & NECK: normal.  No facial edema  EXTREMITIES:  extremities normal, atraumatic, no cyanosis or edema    Lab/Data Review:  BMP:   Lab Results   Component Value Date/Time     05/13/2017 05:12 AM    K 4.4 05/13/2017 05:12 AM     (H) 05/13/2017 05:12 AM    CO2 20 (L) 05/13/2017 05:12 AM    AGAP 11 05/13/2017 05:12 AM    GLU 87 05/13/2017 05:12 AM    BUN 14 05/13/2017 05:12 AM    CREA 0.72 05/13/2017 05:12 AM    GFRAA >60 05/13/2017 05:12 AM    GFRNA >60 05/13/2017 05:12 AM     CMP:   Lab Results   Component Value Date/Time     05/13/2017 05:12 AM    K 4.4 05/13/2017 05:12 AM     (H) 05/13/2017 05:12 AM    CO2 20 (L) 05/13/2017 05:12 AM    AGAP 11 05/13/2017 05:12 AM    GLU 87 05/13/2017 05:12 AM    BUN 14 05/13/2017 05:12 AM    CREA 0.72 05/13/2017 05:12 AM    GFRAA >60 05/13/2017 05:12 AM    GFRNA >60 05/13/2017 05:12 AM    CA 7.6 (L) 05/13/2017 05:12 AM     CBC: No results found for: WBC, HGB, HGBEXT, HCT, HCTEXT, PLT, PLTEXT, HGBEXT, HCTEXT, PLTEXT  COAGS: No results found for: APTT, PTP, INR         Assessment:     Active Problems:    Dizziness (5/10/2017)      Pneumonia (5/10/2017)      SVC (superior vena cava obstruction) (5/10/2017)      LUIS (acute kidney injury) ([facility] [address] (5/10/2017)    S/P Brachiocephalic/SVC stenting    Plan:     Pt should receive some sort of anticoagulation for his recent stenting. Plavix may be used.   This should continue for at least one year  Will see again Monday    Signed By: Shravan Huntley MD     May 13, 2017

## 2017-05-13 NOTE — PROGRESS NOTES
Progress Note      Patient: Reba Urbina               Sex: male          DOA: 5/9/2017       YOB: 1940      Age:  76 y.o.        LOS:  LOS: 3 days             CHIEF COMPLAINT:    Subjective:     Superior vena cava venogram with stent placement on superior vena cava done yesterday by vascular. He feels fine today and denies CP or SOB. Sister at bedside. Objective:      Visit Vitals    /61    Pulse 80    Temp 99.1 °F (37.3 °C)    Resp 18    Ht 6' 2\" (1.88 m)    Wt 179 lb (81.2 kg)    SpO2 91%    BMI 22.98 kg/m2       Physical Exam:  GEN: AAO X 3, NAD  CVS: Normal S1S2, RRR  RESP: CTAB  ABD: Soft, NT/ND, +BS  EXT: No C/C/E  NEURO: CN 2 - 12 intact with no focal deficits noted. Lab/Data Reviewed:  CMP:   Lab Results   Component Value Date/Time     05/13/2017 05:12 AM    K 4.4 05/13/2017 05:12 AM     (H) 05/13/2017 05:12 AM    CO2 20 (L) 05/13/2017 05:12 AM    AGAP 11 05/13/2017 05:12 AM    GLU 87 05/13/2017 05:12 AM    BUN 14 05/13/2017 05:12 AM    CREA 0.72 05/13/2017 05:12 AM    GFRAA >60 05/13/2017 05:12 AM    GFRNA >60 05/13/2017 05:12 AM    CA 7.6 (L) 05/13/2017 05:12 AM     CBC:   Lab Results   Component Value Date/Time    WBC 9.7 05/13/2017 05:12 AM    HGB 12.1 (L) 05/13/2017 05:12 AM    HCT 36.8 05/13/2017 05:12 AM     05/13/2017 05:12 AM           Assessment/Plan     Active Problems:    Dizziness (5/10/2017)      Pneumonia (5/10/2017)      SVC (superior vena cava obstruction) (5/10/2017)      LUIS (acute kidney injury) ([address] (5/10/2017)        Plan:  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 Vascular following and appreciate help.   Hx of prostate and breast cancer and followed by oncology - appreciate help  Large apparently new paratracheal adenopathy suspicious for neoplasm - Pulmonary following and plans for TBNA for definitive diagnosis - appreciate help  Continue current treatment  Appreciate all consultants      Tom Chowdhury DO, MPH  Internal Medicine

## 2017-05-13 NOTE — PROGRESS NOTES
8168- Assumed patient care from [address] [facility]. Patient awake alert and oriented x4 denies pain verbalized feeling better with less swelling in his body/face. Call light within reach will cont. To monitor. 7658- Dr. Meera Varghese in to see patient, gave verbal order to use mediport as needed. 1020- Tolerated am medications. 56- Family member visiting, no change in patient condition. 1740- pt. In bed resting no c/o acute distress. 1925-Bedside and Verbal shift change report given to López Dixon RN (oncoming nurse) by Domi Hubbard RN (offgoing nurse). Report included the following information SBAR, Kardex, ED Summary, [facility] and Recent Results.

## 2017-05-14 LAB
ANION GAP BLD CALC-SCNC: 7 MMOL/L (ref 3–18)
APTT PPP: 34.1 SEC (ref 23–36.4)
BASOPHILS # BLD AUTO: 0 K/UL (ref 0–0.06)
BASOPHILS # BLD AUTO: 0 K/UL (ref 0–0.06)
BASOPHILS # BLD: 0 % (ref 0–2)
BASOPHILS # BLD: 0 % (ref 0–2)
BUN SERPL-MCNC: 9 MG/DL (ref 7–18)
BUN/CREAT SERPL: 14 (ref 12–20)
CALCIUM SERPL-MCNC: 7.3 MG/DL (ref 8.5–10.1)
CHLORIDE SERPL-SCNC: 114 MMOL/L (ref 100–108)
CO2 SERPL-SCNC: 24 MMOL/L (ref 21–32)
CREAT SERPL-MCNC: 0.63 MG/DL (ref 0.6–1.3)
DIFFERENTIAL METHOD BLD: ABNORMAL
DIFFERENTIAL METHOD BLD: ABNORMAL
EOSINOPHIL # BLD: 0.1 K/UL (ref 0–0.4)
EOSINOPHIL # BLD: 0.1 K/UL (ref 0–0.4)
EOSINOPHIL NFR BLD: 1 % (ref 0–5)
EOSINOPHIL NFR BLD: 1 % (ref 0–5)
ERYTHROCYTE [DISTWIDTH] IN BLOOD BY AUTOMATED COUNT: 14.2 % (ref 11.6–14.5)
ERYTHROCYTE [DISTWIDTH] IN BLOOD BY AUTOMATED COUNT: 14.3 % (ref 11.6–14.5)
GLUCOSE BLD STRIP.AUTO-MCNC: 115 MG/DL (ref 70–110)
GLUCOSE BLD STRIP.AUTO-MCNC: 118 MG/DL (ref 70–110)
GLUCOSE BLD STRIP.AUTO-MCNC: 98 MG/DL (ref 70–110)
GLUCOSE SERPL-MCNC: 90 MG/DL (ref 74–99)
HCT VFR BLD AUTO: 34.7 % (ref 36–48)
HCT VFR BLD AUTO: 35 % (ref 36–48)
HGB BLD-MCNC: 11.5 G/DL (ref 13–16)
HGB BLD-MCNC: 11.7 G/DL (ref 13–16)
LYMPHOCYTES # BLD AUTO: 16 % (ref 21–52)
LYMPHOCYTES # BLD AUTO: 18 % (ref 21–52)
LYMPHOCYTES # BLD: 1.6 K/UL (ref 0.9–3.6)
LYMPHOCYTES # BLD: 1.7 K/UL (ref 0.9–3.6)
MCH RBC QN AUTO: 29.1 PG (ref 24–34)
MCH RBC QN AUTO: 29.4 PG (ref 24–34)
MCHC RBC AUTO-ENTMCNC: 33.1 G/DL (ref 31–37)
MCHC RBC AUTO-ENTMCNC: 33.4 G/DL (ref 31–37)
MCV RBC AUTO: 87.8 FL (ref 74–97)
MCV RBC AUTO: 87.9 FL (ref 74–97)
MONOCYTES # BLD: 1.1 K/UL (ref 0.05–1.2)
MONOCYTES # BLD: 1.3 K/UL (ref 0.05–1.2)
MONOCYTES NFR BLD AUTO: 12 % (ref 3–10)
MONOCYTES NFR BLD AUTO: 12 % (ref 3–10)
NEUTS SEG # BLD: 6.4 K/UL (ref 1.8–8)
NEUTS SEG # BLD: 7.5 K/UL (ref 1.8–8)
NEUTS SEG NFR BLD AUTO: 69 % (ref 40–73)
NEUTS SEG NFR BLD AUTO: 71 % (ref 40–73)
PLATELET # BLD AUTO: 179 K/UL (ref 135–420)
PLATELET # BLD AUTO: 201 K/UL (ref 135–420)
PMV BLD AUTO: 10.9 FL (ref 9.2–11.8)
PMV BLD AUTO: 11.2 FL (ref 9.2–11.8)
POTASSIUM SERPL-SCNC: 3.5 MMOL/L (ref 3.5–5.5)
RBC # BLD AUTO: 3.95 M/UL (ref 4.7–5.5)
RBC # BLD AUTO: 3.98 M/UL (ref 4.7–5.5)
SODIUM SERPL-SCNC: 145 MMOL/L (ref 136–145)
WBC # BLD AUTO: 10.6 K/UL (ref 4.6–13.2)
WBC # BLD AUTO: 9.3 K/UL (ref 4.6–13.2)

## 2017-05-14 PROCEDURE — 80048 BASIC METABOLIC PNL TOTAL CA: CPT | Performed by: HOSPITALIST

## 2017-05-14 PROCEDURE — 85025 COMPLETE CBC W/AUTO DIFF WBC: CPT | Performed by: HOSPITALIST

## 2017-05-14 PROCEDURE — 36591 DRAW BLOOD OFF VENOUS DEVICE: CPT

## 2017-05-14 PROCEDURE — 74011250636 HC RX REV CODE- 250/636: Performed by: EMERGENCY MEDICINE

## 2017-05-14 PROCEDURE — 74011250636 HC RX REV CODE- 250/636: Performed by: FAMILY MEDICINE

## 2017-05-14 PROCEDURE — 74011250636 HC RX REV CODE- 250/636: Performed by: INTERNAL MEDICINE

## 2017-05-14 PROCEDURE — 85730 THROMBOPLASTIN TIME PARTIAL: CPT | Performed by: INTERNAL MEDICINE

## 2017-05-14 PROCEDURE — 74011250636 HC RX REV CODE- 250/636: Performed by: SURGERY

## 2017-05-14 PROCEDURE — 74011250637 HC RX REV CODE- 250/637: Performed by: FAMILY MEDICINE

## 2017-05-14 PROCEDURE — 77030020263 HC SOL INJ SOD CL0.9% LFCR 1000ML

## 2017-05-14 PROCEDURE — 36415 COLL VENOUS BLD VENIPUNCTURE: CPT | Performed by: HOSPITALIST

## 2017-05-14 PROCEDURE — 65660000000 HC RM CCU STEPDOWN

## 2017-05-14 PROCEDURE — 82962 GLUCOSE BLOOD TEST: CPT

## 2017-05-14 PROCEDURE — 74011250636 HC RX REV CODE- 250/636: Performed by: HOSPITALIST

## 2017-05-14 PROCEDURE — 74011000258 HC RX REV CODE- 258: Performed by: EMERGENCY MEDICINE

## 2017-05-14 RX ORDER — HEPARIN SODIUM 10000 [USP'U]/100ML
18-36 INJECTION, SOLUTION INTRAVENOUS
Status: DISCONTINUED | OUTPATIENT
Start: 2017-05-14 | End: 2017-05-16 | Stop reason: HOSPADM

## 2017-05-14 RX ORDER — HEPARIN SODIUM 1000 [USP'U]/ML
80 INJECTION, SOLUTION INTRAVENOUS; SUBCUTANEOUS ONCE
Status: COMPLETED | OUTPATIENT
Start: 2017-05-14 | End: 2017-05-14

## 2017-05-14 RX ADMIN — Medication 300 UNITS: at 23:01

## 2017-05-14 RX ADMIN — HEPARIN SODIUM AND DEXTROSE 18 UNITS/KG/HR: 10000; 5 INJECTION INTRAVENOUS at 23:21

## 2017-05-14 RX ADMIN — TAMOXIFEN CITRATE 20 MG: 10 TABLET ORAL at 12:00

## 2017-05-14 RX ADMIN — ATORVASTATIN CALCIUM 80 MG: 40 TABLET, FILM COATED ORAL at 09:19

## 2017-05-14 RX ADMIN — SODIUM CHLORIDE 100 ML/HR: 900 INJECTION, SOLUTION INTRAVENOUS at 06:47

## 2017-05-14 RX ADMIN — SODIUM CHLORIDE 100 ML/HR: 900 INJECTION, SOLUTION INTRAVENOUS at 17:45

## 2017-05-14 RX ADMIN — HEPARIN SODIUM 6500 UNITS: 1000 INJECTION, SOLUTION INTRAVENOUS; SUBCUTANEOUS at 23:02

## 2017-05-14 RX ADMIN — Medication 300 UNITS: at 06:46

## 2017-05-14 RX ADMIN — RAMIPRIL 10 MG: 5 CAPSULE ORAL at 09:19

## 2017-05-14 RX ADMIN — CEFTRIAXONE 2 G: 2 INJECTION, POWDER, FOR SOLUTION INTRAMUSCULAR; INTRAVENOUS at 03:02

## 2017-05-14 RX ADMIN — SODIUM CHLORIDE 500 MG: 900 INJECTION, SOLUTION INTRAVENOUS at 04:08

## 2017-05-14 RX ADMIN — ESCITALOPRAM OXALATE 20 MG: 10 TABLET ORAL at 09:19

## 2017-05-14 RX ADMIN — MULTIVITAMIN TABLET 1 TABLET: TABLET at 09:19

## 2017-05-14 NOTE — PROGRESS NOTES
3700- Assumed care from [address] [place] [address], 201. Pt. Resting in bed eyes closed easily aroused no acute distress noted call light within reach will cont. To monitor. 1000- Pt. Awake ate breakfast, RUE swollen larger than left arm. IVF infusing. 1230- pt. oob siting up in the chair no change in status. 0- Family member sister at bedside visiting no change in status. 1730- Tolerated dinner, in bed resting quietly. 1930-Bedside and Verbal shift change report given to Michelle Palma RN (oncoming nurse) by Milady Menendez RN (offgoing nurse). Report included the following information SBAR, Kardex, OR Summary, MAR and Recent Results.

## 2017-05-14 NOTE — PROGRESS NOTES
Progress Note      Patient: Montana Basurto               Sex: male          DOA: 5/9/2017       YOB: 1940      Age:  76 y.o.        LOS:  LOS: 4 days             CHIEF COMPLAINT:    Subjective:     He feels fine today and denies CP or SOB. Objective:      Visit Vitals    /77 (BP 1 Location: Right arm, BP Patient Position: At rest)    Pulse 77    Temp 98.3 °F (36.8 °C)    Resp 20    Ht 6' 2\" (1.88 m)    Wt 179 lb (81.2 kg)    SpO2 93%    BMI 22.98 kg/m2       Physical Exam:  GEN: AAO X 3, NAD  CVS: Normal S1S2, RRR  RESP: CTAB with Mediport on left chest  ABD: Soft, NT/ND, +BS  EXT: No edema noted  NEURO: CN 2 - 12 intact with no focal deficits noted.         Lab/Data Reviewed:  CMP:   Lab Results   Component Value Date/Time     05/14/2017 05:05 AM    K 3.5 05/14/2017 05:05 AM     (H) 05/14/2017 05:05 AM    CO2 24 05/14/2017 05:05 AM    AGAP 7 05/14/2017 05:05 AM    GLU 90 05/14/2017 05:05 AM    BUN 9 05/14/2017 05:05 AM    CREA 0.63 05/14/2017 05:05 AM    GFRAA >60 05/14/2017 05:05 AM    GFRNA >60 05/14/2017 05:05 AM    CA 7.3 (L) 05/14/2017 05:05 AM     CBC:   Lab Results   Component Value Date/Time    WBC 9.3 05/14/2017 05:05 AM    HGB 11.5 (L) 05/14/2017 05:05 AM    HCT 34.7 (L) 05/14/2017 05:05 AM     05/14/2017 05:05 AM           Assessment/Plan     Active Problems:    Dizziness (5/10/2017)      Pneumonia (5/10/2017)      SVC (superior vena cava obstruction) (5/10/2017)      LUIS (acute kidney injury) ([facility] [address] (5/10/2017)        Plan:  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 Vascular following and appreciate help.   Hx of prostate and breast cancer previously treated with Tamoxifen and followed by oncology - appreciate help  Large apparently new paratracheal adenopathy suspicious for neoplasm - Pulmonary following and plans for TBNA for definitive diagnosis - appreciate help. Once diagnosis is established, treatment options will be per oncologist Dr. Elvis Elaine.   Continue current treatment  Appreciate all consultants        Manuel Doyle DO, MPH  Internal Medicine

## 2017-05-14 NOTE — PROGRESS NOTES
[facility]  Sun 5/14/2017     VSS Afeb; no complaints. PEx:  RUE is swollen and edematous - this is NEW from Friday  Head looks great - pink (was blue)  LUE is normal as are BLEs  Mild tremor RUE (chronic, unchanged)  Lungs clear  Cor RRR 1/6 trivial SM    IMPRESSION:  #  See 5/12  # large paratracheal LN amenable to TBNA  # SVC syndrome s/p stent on Friday -> improved head but there is obstruction of the RUE clinically    PLAN:  1. I will restart heparin drip  2. Vascular surgery will see again tomorrow  3. Dr Damian Eisenmenger covering from tomorrow. I will ask him to do the TBNA when ok with Vascular  4.  Not sure why he needs to be on IVF - have stopped     -taty  [phone]

## 2017-05-14 NOTE — ROUTINE PROCESS
1920: Assumed care. Behavior appropriate to situations. Assessment done. Denies any pain or discomfort at this time. Call light within reach. 2111: No change from previous assessment. 2159: Due meds given. HS snack provided. 0030: Sleeping.  0201: No change from previous assessment.  0302: Due med given. 0408: Due med given. 0505: Blood drawn from MP. Aseptic technique observed. 0557: No change from previous assessment. 5812: Bedside and Verbal shift change report given to Crispin Reyes RN (oncoming nurse) by   Kandy Gaines RN (offgoing nurse). Report included the following information SBAR, Kardex, Intake/Output, MAR and Recent Results.

## 2017-05-15 LAB
ANION GAP BLD CALC-SCNC: 8 MMOL/L (ref 3–18)
APTT PPP: 110.3 SEC (ref 23–36.4)
APTT PPP: >180 SEC (ref 23–36.4)
APTT PPP: >180 SEC (ref 23–36.4)
BASOPHILS # BLD AUTO: 0 K/UL (ref 0–0.06)
BASOPHILS # BLD: 0 % (ref 0–2)
BUN SERPL-MCNC: 10 MG/DL (ref 7–18)
BUN/CREAT SERPL: 14 (ref 12–20)
CALCIUM SERPL-MCNC: 8 MG/DL (ref 8.5–10.1)
CHLORIDE SERPL-SCNC: 109 MMOL/L (ref 100–108)
CO2 SERPL-SCNC: 26 MMOL/L (ref 21–32)
CREAT SERPL-MCNC: 0.74 MG/DL (ref 0.6–1.3)
DIFFERENTIAL METHOD BLD: ABNORMAL
EOSINOPHIL # BLD: 0.2 K/UL (ref 0–0.4)
EOSINOPHIL NFR BLD: 2 % (ref 0–5)
ERYTHROCYTE [DISTWIDTH] IN BLOOD BY AUTOMATED COUNT: 14.4 % (ref 11.6–14.5)
GLUCOSE BLD STRIP.AUTO-MCNC: 104 MG/DL (ref 70–110)
GLUCOSE BLD STRIP.AUTO-MCNC: 106 MG/DL (ref 70–110)
GLUCOSE BLD STRIP.AUTO-MCNC: 118 MG/DL (ref 70–110)
GLUCOSE BLD STRIP.AUTO-MCNC: 94 MG/DL (ref 70–110)
GLUCOSE SERPL-MCNC: 104 MG/DL (ref 74–99)
HCT VFR BLD AUTO: 36 % (ref 36–48)
HGB BLD-MCNC: 11.8 G/DL (ref 13–16)
LYMPHOCYTES # BLD AUTO: 18 % (ref 21–52)
LYMPHOCYTES # BLD: 1.9 K/UL (ref 0.9–3.6)
MCH RBC QN AUTO: 29.1 PG (ref 24–34)
MCHC RBC AUTO-ENTMCNC: 32.8 G/DL (ref 31–37)
MCV RBC AUTO: 88.7 FL (ref 74–97)
MONOCYTES # BLD: 1.1 K/UL (ref 0.05–1.2)
MONOCYTES NFR BLD AUTO: 10 % (ref 3–10)
NEUTS SEG # BLD: 7.1 K/UL (ref 1.8–8)
NEUTS SEG NFR BLD AUTO: 70 % (ref 40–73)
PLATELET # BLD AUTO: 187 K/UL (ref 135–420)
PMV BLD AUTO: 11.7 FL (ref 9.2–11.8)
POTASSIUM SERPL-SCNC: 3.7 MMOL/L (ref 3.5–5.5)
RBC # BLD AUTO: 4.06 M/UL (ref 4.7–5.5)
SODIUM SERPL-SCNC: 143 MMOL/L (ref 136–145)
WBC # BLD AUTO: 10.2 K/UL (ref 4.6–13.2)

## 2017-05-15 PROCEDURE — 74011000258 HC RX REV CODE- 258: Performed by: EMERGENCY MEDICINE

## 2017-05-15 PROCEDURE — 85730 THROMBOPLASTIN TIME PARTIAL: CPT | Performed by: INTERNAL MEDICINE

## 2017-05-15 PROCEDURE — 74011250637 HC RX REV CODE- 250/637: Performed by: FAMILY MEDICINE

## 2017-05-15 PROCEDURE — 85025 COMPLETE CBC W/AUTO DIFF WBC: CPT | Performed by: HOSPITALIST

## 2017-05-15 PROCEDURE — 36592 COLLECT BLOOD FROM PICC: CPT

## 2017-05-15 PROCEDURE — 74011250636 HC RX REV CODE- 250/636: Performed by: SURGERY

## 2017-05-15 PROCEDURE — 36415 COLL VENOUS BLD VENIPUNCTURE: CPT | Performed by: HOSPITALIST

## 2017-05-15 PROCEDURE — 80048 BASIC METABOLIC PNL TOTAL CA: CPT | Performed by: HOSPITALIST

## 2017-05-15 PROCEDURE — 74011250636 HC RX REV CODE- 250/636: Performed by: HOSPITALIST

## 2017-05-15 PROCEDURE — 85730 THROMBOPLASTIN TIME PARTIAL: CPT | Performed by: HOSPITALIST

## 2017-05-15 PROCEDURE — 82962 GLUCOSE BLOOD TEST: CPT

## 2017-05-15 PROCEDURE — 65660000000 HC RM CCU STEPDOWN

## 2017-05-15 PROCEDURE — 74011250636 HC RX REV CODE- 250/636: Performed by: EMERGENCY MEDICINE

## 2017-05-15 PROCEDURE — 74011250636 HC RX REV CODE- 250/636: Performed by: INTERNAL MEDICINE

## 2017-05-15 PROCEDURE — 93971 EXTREMITY STUDY: CPT

## 2017-05-15 RX ADMIN — Medication 300 UNITS: at 07:24

## 2017-05-15 RX ADMIN — MULTIVITAMIN TABLET 1 TABLET: TABLET at 09:45

## 2017-05-15 RX ADMIN — ATORVASTATIN CALCIUM 80 MG: 40 TABLET, FILM COATED ORAL at 09:45

## 2017-05-15 RX ADMIN — ESCITALOPRAM OXALATE 20 MG: 10 TABLET ORAL at 09:45

## 2017-05-15 RX ADMIN — SODIUM CHLORIDE 500 MG: 900 INJECTION, SOLUTION INTRAVENOUS at 04:01

## 2017-05-15 RX ADMIN — CEFTRIAXONE 2 G: 2 INJECTION, POWDER, FOR SOLUTION INTRAMUSCULAR; INTRAVENOUS at 04:00

## 2017-05-15 RX ADMIN — Medication 300 UNITS: at 21:40

## 2017-05-15 RX ADMIN — Medication 300 UNITS: at 14:18

## 2017-05-15 RX ADMIN — HEPARIN SODIUM AND DEXTROSE 15 UNITS/KG/HR: 10000; 5 INJECTION INTRAVENOUS at 16:57

## 2017-05-15 RX ADMIN — RAMIPRIL 10 MG: 5 CAPSULE ORAL at 09:45

## 2017-05-15 RX ADMIN — TAMOXIFEN CITRATE 20 MG: 10 TABLET ORAL at 14:57

## 2017-05-15 NOTE — PROGRESS NOTES
Chart reviewed. Plan remains home when medically stable. Will cont to follow for any needs. Mindy Delgadillo RN,ext. [phone].

## 2017-05-15 NOTE — PROGRESS NOTES
9852 Bedside and Verbal shift change report given to Paolo Broderick RN (oncoming nurse) by Kandice Elizabeth RN (offgoing nurse). Report included the following information SBAR, Kardex, Intake/Output and MAR. Call bell in reach. Herpain rate verified with offgoing nurse. 0676 Shift assessment complete as documented. No complaints of pain. 1200 Blood drawn from mediport for ordered PTT lab.    [address] lab regarding PTT result because result hasn't come through yet. Staff stated that the result is pending but the specimen was received. 1411 Critical result received from lab, results covered by order set. 1418 Heparin gtt held per order. Will restart in an hour. 1514 Heparin gtt restarted and rate decreased by 3/un/kg/hr per order. 1541 Reassessment complete. 1800 U/S tech at bedside performing duplex. 5271 Results of duplex show the patient is positive for DVT. Discussed briefly with Dr. Cohen Session, he stated he will assess patient and speak with Dr. Jazmyne Guerrero. Was told to instruct patient to keep his arm elevated. Bedside and Verbal shift change report given to Tyron Lee RN (oncoming nurse) by Paolo Broderick RN (offgoing nurse). Report included the following information SBAR, Kardex, Intake/Output and MAR. Call bell in reach. Heparin gtt verified with oncoming nurse.

## 2017-05-15 NOTE — PROGRESS NOTES
Progress Note      Patient: Danny Alves               Sex: male          DOA: 5/9/2017       YOB: 1940      Age:  76 y.o.        LOS:  LOS: 5 days             CHIEF COMPLAINT: SVC syndrome in the setting of breast cancer history    Subjective:     Pt s/e at bedside  S/p stent on Friday   Admits increased swelling of RUE  Denies CP or SOB    Objective:      Visit Vitals    /70 (BP 1 Location: Left arm, BP Patient Position: At rest)    Pulse 78    Temp 98.4 °F (36.9 °C)    Resp 18    Ht 6' 2\" (1.88 m)    Wt 81.2 kg (179 lb)    SpO2 92%    BMI 22.98 kg/m2       Physical Exam:  General Appearance: NAD, conversant  Lungs: CTA with normal respiratory effort  CV: RRR, no m/r/g  Abdomen: soft, non-tender, normal bowel sounds  Extremities: no cyanosis, RUE edema w/ ecchymoses R hand  Psych: appropriate affect, alert and oriented to person, place and time    Lab/Data Reviewed:  BMP:   Lab Results   Component Value Date/Time     05/15/2017 05:05 AM    K 3.7 05/15/2017 05:05 AM     (H) 05/15/2017 05:05 AM    CO2 26 05/15/2017 05:05 AM    AGAP 8 05/15/2017 05:05 AM     (H) 05/15/2017 05:05 AM    BUN 10 05/15/2017 05:05 AM    CREA 0.74 05/15/2017 05:05 AM    GFRAA >60 05/15/2017 05:05 AM    GFRNA >60 05/15/2017 05:05 AM     CBC:   Lab Results   Component Value Date/Time    WBC 10.2 05/15/2017 05:05 AM    HGB 11.8 (L) 05/15/2017 05:05 AM    HCT 36.0 05/15/2017 05:05 AM     05/15/2017 05:05 AM           Assessment/Plan     Active Problems:    Dizziness (5/10/2017)      Pneumonia (5/10/2017)      SVC (superior vena cava obstruction) (5/10/2017)      LUIS (acute kidney injury) ([facility] [address] (5/10/2017)    Breast Cancer, previously treated with Tamoxifen  Remote history of Prostate Cancer  New Lymphadenopathy visible by CTA, likely causing SVC syndrome    Plan:  S/p stent and LIJ mediport replacement  Cont heparin gtt for now  Awaiting re-eval by vascular surg  Check venous duplex RUE for DVT  Pulm planning for TBNA when feasible and off heparin gtt  Discussed diagnosis with patient and sister at the bedside    Romain Tierney DO  Internal Medicine, Hospitalist  Pager: [phone]

## 2017-05-15 NOTE — PROCEDURES
[facility]  *** FINAL REPORT ***    Name: Eric Grigsby  MRN: [id number]    Inpatient  : 10 Feb 1940  HIS Order #: [id number]  [address] [facility] Visit #: [id number]  Date: 15 May 2017    TYPE OF TEST: Peripheral Venous Testing    REASON FOR TEST  Limb swelling    Right Arm:-  Deep venous thrombosis:           Yes  Proximal extent of thrombus:      Internal Jugular  Superficial venous thrombosis:    Yes      INTERPRETATION/FINDINGS  Duplex images were obtained using 2-D gray scale, color flow, and  spectral Doppler analysis. Right arm :  1. Deep vein(s) visualized include the internal jugular, subclavian,  axillary, brachial, radial and ulnar veins. 2. Acute occlusive deep venous thrombosis identified in the internal  jugular and subclavian veins. 3. No evidence of deep vein thrombosis in the contralateral subclavian   vein. 4. Superficial vein(s) visualized include the basilic (upper arm),  basilic (forearm), cephalic (upper arm) and cephalic (forearm) veins. 5. Acute occlusive superficial venous thrombus identified at the  origin of the cephalic vein. 6. Compared to the previous exam dated 5/10/17, the deep and  superficial vein thrombus is a new finding. ADDITIONAL COMMENTS  KIERAN Marshall was given a verbal preliminary. Dr Ingrid Bosch was paged at  6:30pm.    I have personally reviewed the data relevant to the interpretation of  this  study. TECHNOLOGIST: Salome Gavin RVT  Signed: 05/15/2017 06:32 PM    PHYSICIAN: Jayshree Ferraro.  Anastasia Danielson MD  Signed: 2017 12:21 AM

## 2017-05-15 NOTE — CONSULTS
Pulmonary:    D/W Dr Ruthann Gurrola     VSS Afeb; no complaints.     PEx:  RUE is less swollen and edematous. Head looks better  LUE is normal as are BLEs  Mild tremor RUE (chronic, unchanged)  Lungs clear  Cor RRR 1/6 trivial SM     IMPRESSION:  # large paratracheal LN amenable to TBNA  # SVC syndrome s/p stent on Friday -> improved head but there is obstruction of the RUE clinically     PLAN:  1. Heparin drip  2. Vascular surgery will see again tomorrow  3. I am covering this week. 4. Will do bronch with TBNA as soon as feasible when he is off Heparin and does not need it anymore: Please let me know.     Patricia Guevara MD.

## 2017-05-15 NOTE — PROGRESS NOTES
2000--Received bedside verbal report from [facility]. Pt in bed with family at bedside. Instructed pt to call for assistance prior to getting out of bed. Call bell within reach with bed in low position    2200-Shift assessment completed. Denies pain. 2300-Ptt 34.1, Heparin infusion hung and started @ 18 units/kg/ hr, bolus administered. 0000-Pt resting quietly and denies pain. Call bell within reach    0500-Blood drawn from the [address] [facility]. 0700-Ptt 110.1. No change in heparin rate      Bedside and Verbal shift change report given to Lois (oncoming nurse) by Raimundo Loera RN (offgoing nurse). Report included the following information SBAR, Kardex, Intake/Output, MAR and Recent Results.

## 2017-05-16 LAB
APTT PPP: 35.1 SEC (ref 23–36.4)
APTT PPP: 66.1 SEC (ref 23–36.4)
BACTERIA SPEC CULT: NORMAL
BACTERIA SPEC CULT: NORMAL
GLUCOSE BLD STRIP.AUTO-MCNC: 101 MG/DL (ref 70–110)
SERVICE CMNT-IMP: NORMAL
SERVICE CMNT-IMP: NORMAL

## 2017-05-16 PROCEDURE — 74011000258 HC RX REV CODE- 258: Performed by: EMERGENCY MEDICINE

## 2017-05-16 PROCEDURE — 85730 THROMBOPLASTIN TIME PARTIAL: CPT | Performed by: HOSPITALIST

## 2017-05-16 PROCEDURE — 74011250637 HC RX REV CODE- 250/637: Performed by: FAMILY MEDICINE

## 2017-05-16 PROCEDURE — 36415 COLL VENOUS BLD VENIPUNCTURE: CPT | Performed by: HOSPITALIST

## 2017-05-16 PROCEDURE — 74011250636 HC RX REV CODE- 250/636: Performed by: SURGERY

## 2017-05-16 PROCEDURE — 82962 GLUCOSE BLOOD TEST: CPT

## 2017-05-16 PROCEDURE — 85730 THROMBOPLASTIN TIME PARTIAL: CPT | Performed by: SURGERY

## 2017-05-16 PROCEDURE — 74011250636 HC RX REV CODE- 250/636: Performed by: EMERGENCY MEDICINE

## 2017-05-16 RX ADMIN — RAMIPRIL 10 MG: 5 CAPSULE ORAL at 11:09

## 2017-05-16 RX ADMIN — ATORVASTATIN CALCIUM 80 MG: 40 TABLET, FILM COATED ORAL at 11:09

## 2017-05-16 RX ADMIN — Medication 300 UNITS: at 06:07

## 2017-05-16 RX ADMIN — SODIUM CHLORIDE 500 MG: 900 INJECTION, SOLUTION INTRAVENOUS at 03:29

## 2017-05-16 RX ADMIN — ESCITALOPRAM OXALATE 20 MG: 10 TABLET ORAL at 11:09

## 2017-05-16 RX ADMIN — CEFTRIAXONE 2 G: 2 INJECTION, POWDER, FOR SOLUTION INTRAMUSCULAR; INTRAVENOUS at 03:29

## 2017-05-16 RX ADMIN — MULTIVITAMIN TABLET 1 TABLET: TABLET at 11:09

## 2017-05-16 NOTE — PROGRESS NOTES
Vascular Surgery Progress Note    Admit Date: 2017  POD * No surgery date entered *    Procedure/Surgery:  Procedure(s):  BRONCHOSCOPY with FNA      Subjective:   PT seen / examined earlier this evening ~ 19:00. Pt alert, comfortable, no dyspnea, no dysphagia. Objective:     Visit Vitals    /81 (BP 1 Location: Left arm, BP Patient Position: Sitting)    Pulse 70    Temp 98.6 °F (37 °C)    Resp 18    Ht 6' 2\" (1.88 m)    Wt 81.2 kg (179 lb)    SpO2 93%    BMI 22.98 kg/m2       Temp (24hrs), Av.4 °F (36.9 °C), Min:98.1 °F (36.7 °C), Max:98.6 °F (37 °C)      Physical Exam:  GEN: A&Ox3, NAD, comfortable. HEENT:PERRL EOMI, non icteric, moist membranes. Head- no longer with edema or plethoric appearing, normal coloration. N  NECK: no JVD, no carotid bruit, no edema. Nontender. LUNG: clear b/l and unlabored breathing  HEART: regular w/o murmur noted  ABD: soft, NT,    EXT: RUE 2+ soft edema from hand to upper arm,R hand with soft ecchymosis. Palpable R radial pulse,   PULSES:palp pedal pulses. Chuy Triana NEURO: no focal lateralizing deficits noted. Motor 5/5. Labs:   Recent Results (from the past 24 hour(s))   CBC WITH AUTOMATED DIFF    Collection Time: 05/15/17  5:05 AM   Result Value Ref Range    WBC 10.2 4.6 - 13.2 K/uL    RBC 4.06 (L) 4.70 - 5.50 M/uL    HGB 11.8 (L) 13.0 - 16.0 g/dL    HCT 36.0 36.0 - 48.0 %    MCV 88.7 74.0 - 97.0 FL    MCH 29.1 24.0 - 34.0 PG    MCHC 32.8 31.0 - 37.0 g/dL    RDW 14.4 11.6 - 14.5 %    PLATELET 927 308 - 391 K/uL    MPV 11.7 9.2 - 11.8 FL    NEUTROPHILS 70 40 - 73 %    LYMPHOCYTES 18 (L) 21 - 52 %    MONOCYTES 10 3 - 10 %    EOSINOPHILS 2 0 - 5 %    BASOPHILS 0 0 - 2 %    ABS. NEUTROPHILS 7.1 1.8 - 8.0 K/UL    ABS. LYMPHOCYTES 1.9 0.9 - 3.6 K/UL    ABS. MONOCYTES 1.1 0.05 - 1.2 K/UL    ABS. EOSINOPHILS 0.2 0.0 - 0.4 K/UL    ABS.  BASOPHILS 0.0 0.0 - 0.06 K/UL    DF AUTOMATED     METABOLIC PANEL, BASIC    Collection Time: 05/15/17  5:05 AM   Result Value Ref Range    Sodium 143 136 - 145 mmol/L    Potassium 3.7 3.5 - 5.5 mmol/L    Chloride 109 (H) 100 - 108 mmol/L    CO2 26 21 - 32 mmol/L    Anion gap 8 3.0 - 18 mmol/L    Glucose 104 (H) 74 - 99 mg/dL    BUN 10 7.0 - 18 MG/DL    Creatinine 0.74 0.6 - 1.3 MG/DL    BUN/Creatinine ratio 14 12 - 20      GFR est AA >60 >60 ml/min/1.73m2    GFR est non-AA >60 >60 ml/min/1.73m2    Calcium 8.0 (L) 8.5 - 10.1 MG/DL   PTT    Collection Time: 05/15/17  5:05 AM   Result Value Ref Range    aPTT 110.3 (H) 23.0 - 36.4 SEC   GLUCOSE, POC    Collection Time: 05/15/17  8:03 AM   Result Value Ref Range    Glucose (POC) 118 (H) 70 - 110 mg/dL   GLUCOSE, POC    Collection Time: 05/15/17 11:36 AM   Result Value Ref Range    Glucose (POC) 106 70 - 110 mg/dL   PTT    Collection Time: 05/15/17 11:50 AM   Result Value Ref Range    aPTT >180.0 (HH) 23.0 - 36.4 SEC   GLUCOSE, POC    Collection Time: 05/15/17  4:28 PM   Result Value Ref Range    Glucose (POC) 104 70 - 110 mg/dL   PTT    Collection Time: 05/15/17  9:37 PM   Result Value Ref Range    aPTT >180.0 (HH) 23.0 - 36.4 SEC   GLUCOSE, POC    Collection Time: 05/15/17 10:12 PM   Result Value Ref Range    Glucose (POC) 94 70 - 110 mg/dL     5/15/17 - RUE venous duplex - acute occlusive thrombus R IJ and R subclavian vein. Assessment:     Active Problems:    Dizziness (5/10/2017)      Pneumonia (5/10/2017)      SVC (superior vena cava obstruction) (5/10/2017)      LUIS (acute kidney injury) ([facility] [address] (5/10/2017)    R IJ SCV DVT - while on Heparin. POD#3 R innominate /upper SVC balloon expandable stent for extrinsic compression - lymph nodes. And replaced L IJ mediport    Plan/Recommendations/Medical Decision Making:   Pt even after stent had poor outflow from arm / IJ , Patent L innominate vein. Recommend continuing anticoagulation indefinitely  Aggressive arm elevation, not a candidate for TPA. Would need FNA chest nodes on heparin window. Kassidy Zacarias.  [address] [place], [place] [address], [address] [facility]  [facility] - [phone]  May 15, 2017  11:45 PM

## 2017-05-16 NOTE — PROGRESS NOTES
Vascular Surgery Progress Note    Admit Date: 2017      Subjective:   Pt reports less R arm/hand discomfort, elevated overnight. Objective:     Visit Vitals    /81    Pulse 80    Temp 98.7 °F (37.1 °C)    Resp 20    Ht 6' 2\" (1.88 m)    Wt 82.1 kg (181 lb)    SpO2 94%    BMI 23.24 kg/m2       Temp (24hrs), Av.4 °F (36.9 °C), Min:98.1 °F (36.7 °C), Max:98.7 °F (37.1 °C)    Physical Exam:  GEN: A&Ox3, NAD, comfortable. HEENT:PERRL EOMI, non icteric, moist membranes. Head- no longer with edema or plethoric appearing, normal coloration. N  NECK: no JVD, no carotid bruit, no edema. Nontender. LUNG: clear b/l and unlabored breathing  HEART: regular w/o murmur noted  ABD: soft, NT,   EXT: RUE 2+ soft edema from hand to upper arm, somewhat improved overnight. R hand with soft ecchymosis. Palpable R radial pulse,   PULSES:palp pedal pulses. Ceclia Luke NEURO: no focal lateralizing deficits noted. Motor 5/5. Labs:   Recent Results (from the past 24 hour(s))   GLUCOSE, POC    Collection Time: 05/15/17  4:28 PM   Result Value Ref Range    Glucose (POC) 104 70 - 110 mg/dL   PTT    Collection Time: 05/15/17  9:37 PM   Result Value Ref Range    aPTT >180.0 (HH) 23.0 - 36.4 SEC   GLUCOSE, POC    Collection Time: 05/15/17 10:12 PM   Result Value Ref Range    Glucose (POC) 94 70 - 110 mg/dL   PTT    Collection Time: 17  5:44 AM   Result Value Ref Range    aPTT 66.1 (H) 23.0 - 36.4 SEC   GLUCOSE, POC    Collection Time: 17  7:44 AM   Result Value Ref Range    Glucose (POC) 101 70 - 110 mg/dL   PTT    Collection Time: 17  9:38 AM   Result Value Ref Range    aPTT 35.1 23.0 - 36.4 SEC       Assessment:     Active Problems:    Dizziness (5/10/2017)      Pneumonia (5/10/2017)      SVC (superior vena cava obstruction) (5/10/2017)      LUIS (acute kidney injury) ([facility] [address] (5/10/2017)    R IJ SCV DVT - while on Heparin.    POD#4 R innominate /upper SVC balloon expandable stent for extrinsic compression - lymph nodes. [facility]       Plan/Recommendations/Medical Decision Making:   Pt non therapeutic on Heparin this a.m.? Continue aggressive R arm elevation,   Discussed with Dr. Anthony Elise -to be converted to Eliquis - and will be d/c f/u outpt with pulm for future biopsy mediastinal nodes. Will arrange f/u as outpt - office to arrange. Bryan Chandler.  Gita Moe, [facility] [facility]  [facility] - [phone]  May 16, 2017  12:56 PM

## 2017-05-16 NOTE — PROGRESS NOTES
Looks and feels better. Plan for d/c on eliquis noted. Patient will have o/p biopsy with Dr Mart Dempsey next week.  He was instructed to follow up with me 5 days post biopsy for therapy options    Fara Leonardo MD  [phone]

## 2017-05-16 NOTE — ROUTINE PROCESS
Care Management Interventions  PCP Verified by CM: Yes  Last Visit to PCP: 04/19/17  Mode of Transport at Discharge:  Other (see comment)  Transition of Care Consult (CM Consult): Discharge Planning  Current Support Network: Lives Alone  Confirm Follow Up Transport: Self  Plan discussed with Pt/Family/Caregiver: Yes  Discharge Location  Discharge Placement: Home

## 2017-05-16 NOTE — PROGRESS NOTES
9594 -- Bedside and Verbal shift change report given to [place] (oncoming nurse) by Blane Aguillon RN (offgoing nurse). Report included the following information SBAR, Procedure Summary, Intake/Output, MAR, Recent Results. Whiteboard updated with information. Will contact lab for PTT and Chem 7 drawn at 0530, still no results.       1109 -- AM meds administered, pt tolerated with ease.        1333 -- Pt to be discharged, Cardiac monitor has been removed.

## 2017-05-16 NOTE — PROGRESS NOTES
conducted a Follow up consultation and Spiritual Assessment for Shruti Vuong, who is a 76 y.o.,male. The  provided the following Interventions:  Continued the relationship of care and support. Listened empathically. Offered prayer and assurance of continued prayer on patients behalf. Chart reviewed. The following outcomes were achieved:  Patient expressed gratitude for pastoral care visit. Assessment:  There are no further spiritual or Buddhist issues which require Spiritual Care Services interventions at this time. Plan:  Chaplains will continue to follow and will provide pastoral care on an as needed/requested basis.  recommends bedside caregivers page  on duty if patient shows signs of acute spiritual or emotional distress.      [facility]   Staff [address] [facility]   [phone]

## 2017-05-16 NOTE — DISCHARGE INSTRUCTIONS
DISCHARGE SUMMARY from Nurse    The following personal items are in your possession at time of discharge:    Dental Appliances: Uppers, Partials  Visual Aid: None     Home Medications: None  Jewelry: None  Clothing: Jacket/Coat, Pants, Footwear, At bedside, Socks  Other Valuables: None             PATIENT INSTRUCTIONS:    After general anesthesia or intravenous sedation, for 24 hours or while taking prescription Narcotics:  · Limit your activities  · Do not drive and operate hazardous machinery  · Do not make important personal or business decisions  · Do  not drink alcoholic beverages  · If you have not urinated within 8 hours after discharge, please contact your surgeon on call. Report the following to your surgeon:  · Excessive pain, swelling, redness or odor of or around the surgical area  · Temperature over 100.5  · Nausea and vomiting lasting longer than 4 hours or if unable to take medications  · Any signs of decreased circulation or nerve impairment to extremity: change in color, persistent  numbness, tingling, coldness or increase pain  · Any questions        What to do at Home:  Recommended activity: Activity as tolerated. If you experience any of the following symptoms fever, shortness of breath, or dizziness please follow up with Primary Care MD or Emergency Unit. *  Please give a list of your current medications to your Primary Care Provider. *  Please update this list whenever your medications are discontinued, doses are      changed, or new medications (including over-the-counter products) are added. *  Please carry medication information at all times in case of emergency situations. These are general instructions for a healthy lifestyle:    No smoking/ No tobacco products/ Avoid exposure to second hand smoke    Surgeon General's Warning:  Quitting smoking now greatly reduces serious risk to your health.     Obesity, smoking, and sedentary lifestyle greatly increases your risk for illness    A healthy diet, regular physical exercise & weight monitoring are important for maintaining a healthy lifestyle    You may be retaining fluid if you have a history of heart failure or if you experience any of the following symptoms:  Weight gain of 3 pounds or more overnight or 5 pounds in a week, increased swelling in our hands or feet or shortness of breath while lying flat in bed. Please call your doctor as soon as you notice any of these symptoms; do not wait until your next office visit. Recognize signs and symptoms of STROKE:    F-face looks uneven    A-arms unable to move or move unevenly    S-speech slurred or non-existent    T-time-call 911 as soon as signs and symptoms begin-DO NOT go       Back to bed or wait to see if you get better-TIME IS BRAIN. Warning Signs of HEART ATTACK     Call 911 if you have these symptoms:   Chest discomfort. Most heart attacks involve discomfort in the center of the chest that lasts more than a few minutes, or that goes away and comes back. It can feel like uncomfortable pressure, squeezing, fullness, or pain.  Discomfort in other areas of the upper body. Symptoms can include pain or discomfort in one or both arms, the back, neck, jaw, or stomach.  Shortness of breath with or without chest discomfort.  Other signs may include breaking out in a cold sweat, nausea, or lightheadedness. Don't wait more than five minutes to call 911 - MINUTES MATTER! Fast action can save your life. Calling 911 is almost always the fastest way to get lifesaving treatment. Emergency Medical Services staff can begin treatment when they arrive -- up to an hour sooner than if someone gets to the hospital by car. The discharge information has been reviewed with the patient. The patient verbalized understanding. Discharge medications reviewed with the patient and appropriate educational materials and side effects teaching were provided.     [employer]    Thank you for enrolling in [address] Please follow the instructions below to securely access your online medical record. [employer] allows you to send messages to your doctor, view your test results, renew your prescriptions, schedule appointments, and more. How Do I Sign Up? 1. In your internet browser, go to [email or web address]. 2. Click on the First Time User? Click Here link in the Sign In box. You will see the New Member Sign Up page. 3. Enter your [employer] Access Code exactly as it appears below. You will not need to use this code after youve completed the sign-up process. If you do not sign up before the expiration date, you must request a new code. [employer] Access Code: [email or web address]  Expires: 8/14/2017  1:23 PM     4. Enter the last four digits of your Social Security Number (xxxx) and Date of Birth (mm/dd/yyyy) as indicated and click Submit. You will be taken to the next sign-up page. 5. Create a [employer] ID. This will be your [employer] login ID and cannot be changed, so think of one that is secure and easy to remember. 6. Create a [employer] password. You can change your password at any time. 7. Enter your Password Reset Question and Answer. This can be used at a later time if you forget your password. 8. Enter your e-mail address. You will receive e-mail notification when new information is available in [address] 9. Click Sign Up. You can now view your medical record. Additional Information    Remember, [employer] is NOT to be used for urgent needs. For medical emergencies, dial 911. Now available from your iPhone and Android!     Patient armband removed and shredded

## 2017-05-16 NOTE — ROUTINE PROCESS
Bedside and Verbal shift change report given to Matthieu Duval RN (oncoming nurse) by Coy Ballard RN (offgoing nurse). Report included the following information SBAR, Kardex and MAR. Patient had no acute events overnight. Currently resting in NAD. No express needs reported at this time. Heparin infusion rate verified with on-coming and off-going RNs.    [facility] lab regarding samples for aPTT and CBC sent at approximately 0530 that have not yet resulted. Spoke with Randy Meza who reports that it looks as though the samples have been sitting in lab waiting to be \"received\" for processing. Will receive and process the samples now.

## 2017-05-16 NOTE — ANCILLARY DISCHARGE INSTRUCTIONS
Patient and/or next of kin has been given the [facility] Important Message From Medicare About Your Rights\" letter and all questions were answered.

## 2017-05-16 NOTE — CONSULTS
Pulmonary:    Discussed with Dr Sydni Otero  The patient needs a bronchoscopy with TBNA  Timing is very important  Needs full anticoagulation  As D/W Dr Sydni Otero I will do his Bronch/Bx/TBNA as O/p with Lovenox bridge.     Dr Sydni Otero will make an appointment for me to see him as outpatient

## 2017-05-17 NOTE — DISCHARGE SUMMARY
Internal Medicine Discharge Summary        Patient: Hortensia Falcon    YOB: 1940    Age:  76 y.o. Admit Date: 5/9/2017    Discharge Date: 5/17/2017    LOS:  LOS: 6 days     Discharge To: Home    Consults: Hematology/Oncology, Pulmonary/Critical Care and Vascular Surgery    Admission Diagnoses: SVC (superior vena cava obstruction)  Dizziness  Pneumonia  LUIS (acute kidney injury) ([facility] [address]  new paratracheal adenopathy    Discharge Diagnoses:    Problem List as of 5/16/2017  Never Reviewed          Codes Class Noted - Resolved    Dizziness ICD-10-CM: R42  ICD-9-CM: 780.4  5/10/2017 - Present        Pneumonia ICD-10-CM: J18.9  ICD-9-CM: 486  5/10/2017 - Present        SVC (superior vena cava obstruction) ICD-10-CM: I87.1  ICD-9-CM: 459.2  5/10/2017 - Present        LUIS (acute kidney injury) ([facility] [address] ICD-10-CM: N17.9  ICD-9-CM: 584.9  5/10/2017 - Present              Discharge Condition:  Improved    Procedures: Brachiocephalic stent, Superior vena cava stent, replacement of mediport         HPI: Hortensia Falcon is a 76 y.o. male who presents with c/o dizziness of long standing . Reports that is has become progressively worse. Had a fall a few days ago and bumped in face on the furniture. He denies loss of consciousness. Today was seen earlier at [facility] for same symptom and was discharged home after preliminary lab tests. He reports that when he went home his symptoms started again and is worse with standing. He is negative for vertigo, chest pain, headache,sob. Patient in ED had elevated ddimer. CT chest shows an occluded SVC with collateral through azygous vein. Patient was started on Heparin drip . PVL BUE pending. Vascular surgery consulted. Hospital Course (Including Significant Findings): The patient was admitted to the hospital for further management of their presenting condition.  He had a brachiocephalic and SVC stent placed on 05/12 by Dr. Donneta Frankel as well as mediport replacement. Afterwards, his heparin gtt was stopped. He then developed acute swelling in his right upper extremity. Duplex of the arm on 05/15 showed DVT in his right internal jugular and subclavian veins. Heparin gtt was restarted. He elected to move forward with biopsy of mass as an outpatient with Dr. Kye Evangelista the following week. He was transitioned to eliquis for anti-coagulation, which he will need lifelong therapy with. He was treated for 7 days with IV antibiotics for a pneumonia, which did not cause any issues during his stay. The rest of the patient's chronic conditions were managed appropriately during their admission. They were medically stable at the time of discharge. Visit Vitals    /76    Pulse 76    Temp 98.8 °F (37.1 °C)    Resp 20    Ht 6' 2\" (1.88 m)    Wt 82.1 kg (181 lb)    SpO2 96%    BMI 23.24 kg/m2       Physical Exam at Discharge:  General Appearance: NAD, conversant  HENT: normocephalic/atraumatic, moist mucus membranes  Lungs: CTA with normal respiratory effort  CV: RRR, no m/r/g  Abdomen: soft, non-tender, normal bowel sounds  Extremities: no cyanosis, RUE swelling  Neuro: moves all extremities, no focal deficits  Psych: appropriate affect, alert and oriented to person, place and time    Labs Prior to Discharge:  Labs: Results:       Chemistry Recent Labs      05/15/17   0505   GLU  104*   NA  143   K  3.7   CL  109*   CO2  26   BUN  10   CREA  0.74   CA  8.0*   AGAP  8   BUCR  14      CBC w/Diff Recent Labs      05/15/17   0505  05/14/17 2005   WBC  10.2  10.6   RBC  4.06*  3.98*   HGB  11.8*  11.7*   HCT  36.0  35.0*   PLT  187  201   GRANS  70  71   LYMPH  18*  16*   EOS  2  1      Cardiac Enzymes No results for input(s): CPK, CKND1, ZEV in the last 72 hours.     No lab exists for component: CKRMB, TROIP   Coagulation Recent Labs      05/16/17   0938  05/16/17   0544   APTT  35.1  66.1*       Lipid Panel No results found for: CHOL, [address], [address] [facility], [address] [facility], [address] [place] [address] Rd, [id number], HDL, LDL, NLDLCT, DLDL, LDLC, DLDLP, 149924, VLDLC, VLDL, TGL, TGLX, TRIGL, [id number], TRIGP, TGLPOCT, [id number], CHHD, CHHDX   BNP No results for input(s): BNPP in the last 72 hours. Liver Enzymes No results for input(s): TP, ALB, TBIL, AP, SGOT, GPT in the last 72 hours. No lab exists for component: DBIL   Thyroid Studies No results found for: T4, T3U, TSH, TSHEXT         Significant Imaging:  Cta Chest W Or W Wo Cont    Result Date: 5/10/2017  EXAM: CTA Chest INDICATION: Syncope. Short of breath. COMPARISON: None. TECHNIQUE: Axial CT imaging from the thoracic inlet through the diaphragm with intravenous contrast. Coronal and sagittal MIP reformats were generated. One or more dose reduction techniques were used on this CT: automated exposure control, adjustment of the mAs and/or kVp according to patient's size, and iterative reconstruction techniques. The specific techniques utilized on this CT exam have been documented in the patient's electronic medical record. Note: Preliminary report sent to the Emergency Department by the radiology resident at the time of the study, wet read available at 3:02 AM. _______________ FINDINGS: EXAM QUALITY: Exam is nondiagnostic for pulmonary embolus. PULMONARY ARTERIES: Nondiagnostic for pulmonary embolus. There is occlusion of the SVC. There are multiple collateral vessels in the right chest wall, right upper arm, and through the azygos vein. MEDIASTINUM: Prior median sternotomy and CABG. Normal heart size. Tiny pericardial effusion. The aorta is mildly prominent with the descending aorta measuring 3.1 cm. there are mild arthrosclerotic vascular calcifications. LYMPH NODES: There is significant low density adenopathy in the mediastinum and right hilum. The largest mediastinal lymph node is in the right paratracheal stripe measuring 3.7 x 3.7 cm. The largest right hilar lymph node measures 4.0 x 3.3 cm. Possible low density left axillary adenopathy measuring 13 mm.  LUNGS: Posterior right upper lobe patchy interstitial and airspace infiltrate. Tiny areas of tree-in-bud nodularity in the left upper lobe/lingula and posterior right lower lobe. PLEURA: No pneumothorax or pleural effusion. UPPER ABDOMEN: The left adrenal gland is chunky with a 1.8 cm nonspecific nodule. The right adrenal gland appears mildly prominent, but no discrete nodules. There are several subcentimeter hypodensities in the liver, too small to adequately characterize. OTHER: There is a left-sided Mediport catheter with its tip in the proximal SVC. There is left axillary edema. BONES: No acute or aggressive osseous abnormalities identified. _______________     IMPRESSION: 1. Nondiagnostic for pulmonary embolism. This is likely due to occluded SVC. Etiology of the thrombus is unknown and may be from the left Mediport or malignant thrombus. 2. Significant low-density mediastinal and right hilar adenopathy concerning for necrotic metastatic disease, infection including TB thought less likely but a consideration. 3. Left axillary edema, likely from the occluded SVC. 4. Right upper lobe airspace disease concerning for pneumonia. Additional subtle areas of tree-in-bud density in the right lower lobe and left upper lobe/lingula, likely inflammatory/infectious. 5. Indeterminate left adrenal gland nodule. Tiny hepatic hypodensities. Xr Chest Port    Result Date: 5/10/2017  CHEST AP PORTABLE, 1 View Clinical History:  Near syncope. Short of breath. Comparison:  None. Findings: There is a left-sided Mediport catheter. Prior median sternotomy and CABG. The heart size is within normal limits. Prominence in the region of the right paratracheal stripe. Right hilar prominence. Subtle reticular nodular infiltrate in the right upper lobe. No evidence for pneumothorax or pleural effusion. The aorta is elongated. IMPRESSION: 1. Subtle patchy infiltrate in the right upper lobe, recommend clinical correlation for pneumonia. Prominent and the region of the right paratracheal stripe and right hilum concerning for underlying adenopathy/mass. Consider chest CT for further evaluation. Discharge Medications:     Discharge Medication List as of 5/16/2017  1:26 PM      CONTINUE these medications which have CHANGED    Details   apixaban (ELIQUIS) 5 mg tablet Take two tabs two times daily for 7 days and then 1 tab two times daily  Indications: deep venous thrombosis, Normal, Disp-74 Tab, R-0         CONTINUE these medications which have NOT CHANGED    Details   albuterol (PROVENTIL HFA, VENTOLIN HFA, PROAIR HFA) 90 mcg/actuation inhaler Take  by inhalation. , Historical Med      alendronate (FOSAMAX) 35 mg tablet Take 35 mg by mouth every seven (7) days. , Historical Med      aspirin 81 mg chewable tablet Take 81 mg by mouth daily. , Historical Med      atorvastatin (LIPITOR) 80 mg tablet Take 80 mg by mouth daily. , Historical Med      escitalopram oxalate (LEXAPRO) 20 mg tablet Take 20 mg by mouth daily. , Historical Med      multivitamin (ONE A DAY) tablet Take 1 Tab by mouth daily. , Historical Med      polyethylene glycol (MIRALAX) 17 gram packet Take 17 g by mouth daily. , Historical Med      ramipril (ALTACE) 10 mg capsule Take 10 mg by mouth daily. , Historical Med      tamoxifen (NOLVADEX) 20 mg tablet Take 20 mg by mouth daily. , Historical Med      vardenafil (LEVITRA) 20 mg tablet Take 5 mg by mouth as needed., Historical Med      sildenafil citrate (VIAGRA) 100 mg tablet Take 1 Tab by mouth daily as needed for 4 doses. , Normal, Disp-5 Tab, R-12         STOP taking these medications       topiramate (TOPAMAX) 50 mg tablet Comments:   Reason for Stopping:               Activity: Activity as tolerated    Diet: Resume previous diet    Wound Care: None needed    Follow-up:   Please follow up with your PCP within 7 days to discuss your recent hospitalization. Patient to arrange.   Follow up with Dr. Ge Shaikh Thursday, 25th 130pm  Follow up with Hem/onc after biopsy       Total time spent including time spent on final examination and discharge discussion, discharge documentation and records reviewed and medication reconciliation: > 30 minutes    Quincy Bolton DO  Internal Medicine, Hospitalist  Pager: [facility]

## 2017-05-22 NOTE — ANCILLARY DISCHARGE INSTRUCTIONS
[facility]  Discharge Phone Call       After-Care Discharge Phone Call Questions: no answer     Were you able to get your prescriptions filled? Comment:      [] Yes  []No    Comment if answer is \"No\"   Are you taking your medication(s) as your doctor ordered? Do you understand the purpose of your medications? Comment:    [] Yes  []No    Comment if answer is \"No\"   Are you taking any other medications that are not on the list?  Comment:      [] Yes  []No    Comment if answer is \"Yes\"   Do you have any questions about your medications? Are you aware of potential side effects? Comment:    [] Yes  []No    Comment if answer is \"Yes\"   Did you make your follow-up appointments (if the hospital did not do this before  discharge)? Comment:    [] Yes  []No    Comment if answer is \"No\"   Is there any reason you might not be able to keep your follow-up appointments? Comment:     [] Yes  []No    Comment if answer is \"Yes\"   Do you have any questions about your care plan? Are you aware of what health problems to be alert for? Comment:    [] Yes  []No    Comment if answer is \"Yes\"   Do you have a good understanding of how you should manage your health? Comment:    [] Yes  []No    Comment if answer is \"Yes\"   Do you know which symptoms to watch for that would mean you would need to call your doctor right away? Comment:      [] Yes  []No    Comment if answer is \"No\"   Do you have any questions about the follow up process or any instructions that we have provided? Comment:    [] Yes  []No    Comment if answer is \"Yes\"   Did staff take your preferences into account?         [] Yes  []No    Comment if answer is \"Yes\"

## 2017-05-24 VITALS
TEMPERATURE: 98.8 F | SYSTOLIC BLOOD PRESSURE: 124 MMHG | OXYGEN SATURATION: 96 % | RESPIRATION RATE: 20 BRPM | HEART RATE: 76 BPM | BODY MASS INDEX: 23.23 KG/M2 | WEIGHT: 181 LBS | DIASTOLIC BLOOD PRESSURE: 76 MMHG | HEIGHT: 74 IN

## 2017-06-12 ENCOUNTER — ANESTHESIA EVENT (OUTPATIENT)
Dept: ENDOSCOPY | Age: 77
End: 2017-06-12
Payer: MEDICARE

## 2017-06-13 ENCOUNTER — APPOINTMENT (OUTPATIENT)
Dept: GENERAL RADIOLOGY | Age: 77
End: 2017-06-13
Attending: INTERNAL MEDICINE
Payer: MEDICARE

## 2017-06-13 ENCOUNTER — ANESTHESIA (OUTPATIENT)
Dept: ENDOSCOPY | Age: 77
End: 2017-06-13
Payer: MEDICARE

## 2017-06-13 ENCOUNTER — HOSPITAL ENCOUNTER (OUTPATIENT)
Age: 77
Setting detail: OUTPATIENT SURGERY
Discharge: HOME | End: 2017-06-13
Attending: INTERNAL MEDICINE | Admitting: INTERNAL MEDICINE
Payer: MEDICARE

## 2017-06-13 VITALS
WEIGHT: 170 LBS | HEART RATE: 73 BPM | OXYGEN SATURATION: 92 % | HEIGHT: 74 IN | DIASTOLIC BLOOD PRESSURE: 66 MMHG | RESPIRATION RATE: 17 BRPM | SYSTOLIC BLOOD PRESSURE: 108 MMHG | TEMPERATURE: 98.3 F | BODY MASS INDEX: 21.82 KG/M2

## 2017-06-13 PROBLEM — C50.919 BREAST CANCER (HCC): Status: ACTIVE | Noted: 2017-06-13

## 2017-06-13 PROBLEM — J98.59 MEDIASTINAL MASS: Status: ACTIVE | Noted: 2017-06-13

## 2017-06-13 PROCEDURE — 77030003454 HC NDL BIOP BRONCH BSC -B: Performed by: INTERNAL MEDICINE

## 2017-06-13 PROCEDURE — 88173 CYTOPATH EVAL FNA REPORT: CPT | Performed by: INTERNAL MEDICINE

## 2017-06-13 PROCEDURE — 77030008680 HC TU ET BRONCH COOK -C: Performed by: INTERNAL MEDICINE

## 2017-06-13 PROCEDURE — 76040000007: Performed by: INTERNAL MEDICINE

## 2017-06-13 PROCEDURE — 74011250636 HC RX REV CODE- 250/636

## 2017-06-13 PROCEDURE — 88305 TISSUE EXAM BY PATHOLOGIST: CPT | Performed by: INTERNAL MEDICINE

## 2017-06-13 PROCEDURE — 76060000032 HC ANESTHESIA 0.5 TO 1 HR: Performed by: INTERNAL MEDICINE

## 2017-06-13 PROCEDURE — 76210000016 HC OR PH I REC 1 TO 1.5 HR: Performed by: INTERNAL MEDICINE

## 2017-06-13 PROCEDURE — 71010 XR CHEST INSP AND EXP: CPT

## 2017-06-13 PROCEDURE — 88342 IMHCHEM/IMCYTCHM 1ST ANTB: CPT | Performed by: INTERNAL MEDICINE

## 2017-06-13 PROCEDURE — 77030022556 HC FCPS BIOP TIS ENDOSC BSC -B: Performed by: INTERNAL MEDICINE

## 2017-06-13 PROCEDURE — 77030018712 HC DEV BLLN INFL BSC -B: Performed by: INTERNAL MEDICINE

## 2017-06-13 PROCEDURE — 88341 IMHCHEM/IMCYTCHM EA ADD ANTB: CPT | Performed by: INTERNAL MEDICINE

## 2017-06-13 PROCEDURE — 77030012699 HC VLV SUC CNTRL OCOA -A: Performed by: INTERNAL MEDICINE

## 2017-06-13 PROCEDURE — 77030008477 HC STYL SATN SLP COVD -A: Performed by: NURSE ANESTHETIST, CERTIFIED REGISTERED

## 2017-06-13 PROCEDURE — 88112 CYTOPATH CELL ENHANCE TECH: CPT | Performed by: INTERNAL MEDICINE

## 2017-06-13 PROCEDURE — 74011000250 HC RX REV CODE- 250

## 2017-06-13 PROCEDURE — 77030008683 HC TU ET CUF COVD -A: Performed by: NURSE ANESTHETIST, CERTIFIED REGISTERED

## 2017-06-13 PROCEDURE — 74011250636 HC RX REV CODE- 250/636: Performed by: NURSE ANESTHETIST, CERTIFIED REGISTERED

## 2017-06-13 PROCEDURE — 87070 CULTURE OTHR SPECIMN AEROBIC: CPT | Performed by: INTERNAL MEDICINE

## 2017-06-13 PROCEDURE — 77030018836 HC SOL IRR NACL ICUM -A: Performed by: INTERNAL MEDICINE

## 2017-06-13 RX ORDER — SODIUM CHLORIDE, SODIUM LACTATE, POTASSIUM CHLORIDE, CALCIUM CHLORIDE 600; 310; 30; 20 MG/100ML; MG/100ML; MG/100ML; MG/100ML
75 INJECTION, SOLUTION INTRAVENOUS CONTINUOUS
Status: DISCONTINUED | OUTPATIENT
Start: 2017-06-14 | End: 2017-06-13 | Stop reason: HOSPADM

## 2017-06-13 RX ORDER — SODIUM CHLORIDE 9 MG/ML
25 INJECTION, SOLUTION INTRAVENOUS CONTINUOUS
Status: DISCONTINUED | OUTPATIENT
Start: 2017-06-13 | End: 2017-06-13 | Stop reason: HOSPADM

## 2017-06-13 RX ORDER — GLYCOPYRROLATE 0.2 MG/ML
0.1 INJECTION INTRAMUSCULAR; INTRAVENOUS ONCE
Status: DISCONTINUED | OUTPATIENT
Start: 2017-06-13 | End: 2017-06-13 | Stop reason: HOSPADM

## 2017-06-13 RX ORDER — SODIUM CHLORIDE 0.9 % (FLUSH) 0.9 %
5-10 SYRINGE (ML) INJECTION EVERY 8 HOURS
Status: DISCONTINUED | OUTPATIENT
Start: 2017-06-13 | End: 2017-06-13 | Stop reason: HOSPADM

## 2017-06-13 RX ORDER — FLUMAZENIL 0.1 MG/ML
0.2 INJECTION INTRAVENOUS
Status: DISCONTINUED | OUTPATIENT
Start: 2017-06-13 | End: 2017-06-13 | Stop reason: HOSPADM

## 2017-06-13 RX ORDER — PROPOFOL 10 MG/ML
INJECTION, EMULSION INTRAVENOUS AS NEEDED
Status: DISCONTINUED | OUTPATIENT
Start: 2017-06-13 | End: 2017-06-13 | Stop reason: HOSPADM

## 2017-06-13 RX ORDER — FENTANYL CITRATE 50 UG/ML
50 INJECTION, SOLUTION INTRAMUSCULAR; INTRAVENOUS
Status: DISCONTINUED | OUTPATIENT
Start: 2017-06-13 | End: 2017-06-13 | Stop reason: HOSPADM

## 2017-06-13 RX ORDER — NALOXONE HYDROCHLORIDE 0.4 MG/ML
0.1 INJECTION, SOLUTION INTRAMUSCULAR; INTRAVENOUS; SUBCUTANEOUS AS NEEDED
Status: DISCONTINUED | OUTPATIENT
Start: 2017-06-13 | End: 2017-06-13 | Stop reason: HOSPADM

## 2017-06-13 RX ORDER — LIDOCAINE HYDROCHLORIDE 20 MG/ML
INJECTION, SOLUTION EPIDURAL; INFILTRATION; INTRACAUDAL; PERINEURAL AS NEEDED
Status: DISCONTINUED | OUTPATIENT
Start: 2017-06-13 | End: 2017-06-13 | Stop reason: HOSPADM

## 2017-06-13 RX ORDER — ONDANSETRON 2 MG/ML
4 INJECTION INTRAMUSCULAR; INTRAVENOUS
Status: DISCONTINUED | OUTPATIENT
Start: 2017-06-13 | End: 2017-06-13 | Stop reason: HOSPADM

## 2017-06-13 RX ORDER — LIDOCAINE HYDROCHLORIDE 20 MG/ML
JELLY TOPICAL ONCE
Status: DISCONTINUED | OUTPATIENT
Start: 2017-06-13 | End: 2017-06-13 | Stop reason: HOSPADM

## 2017-06-13 RX ORDER — MIDAZOLAM HYDROCHLORIDE 1 MG/ML
.5-2 INJECTION, SOLUTION INTRAMUSCULAR; INTRAVENOUS
Status: DISCONTINUED | OUTPATIENT
Start: 2017-06-13 | End: 2017-06-13 | Stop reason: HOSPADM

## 2017-06-13 RX ORDER — LIDOCAINE HYDROCHLORIDE 40 MG/ML
SOLUTION TOPICAL AS NEEDED
Status: DISCONTINUED | OUTPATIENT
Start: 2017-06-13 | End: 2017-06-13 | Stop reason: HOSPADM

## 2017-06-13 RX ORDER — SUCCINYLCHOLINE CHLORIDE 20 MG/ML
INJECTION INTRAMUSCULAR; INTRAVENOUS AS NEEDED
Status: DISCONTINUED | OUTPATIENT
Start: 2017-06-13 | End: 2017-06-13 | Stop reason: HOSPADM

## 2017-06-13 RX ORDER — LIDOCAINE HYDROCHLORIDE 10 MG/ML
50 INJECTION INFILTRATION; PERINEURAL ONCE
Status: DISCONTINUED | OUTPATIENT
Start: 2017-06-13 | End: 2017-06-13 | Stop reason: HOSPADM

## 2017-06-13 RX ORDER — SODIUM CHLORIDE 0.9 % (FLUSH) 0.9 %
5-10 SYRINGE (ML) INJECTION AS NEEDED
Status: DISCONTINUED | OUTPATIENT
Start: 2017-06-13 | End: 2017-06-13 | Stop reason: HOSPADM

## 2017-06-13 RX ADMIN — LIDOCAINE HYDROCHLORIDE 4 ML: 40 SOLUTION TOPICAL at 12:49

## 2017-06-13 RX ADMIN — PROPOFOL 120 MG: 10 INJECTION, EMULSION INTRAVENOUS at 12:48

## 2017-06-13 RX ADMIN — SUCCINYLCHOLINE CHLORIDE 80 MG: 20 INJECTION INTRAMUSCULAR; INTRAVENOUS at 12:49

## 2017-06-13 RX ADMIN — LIDOCAINE HYDROCHLORIDE 20 MG: 20 INJECTION, SOLUTION EPIDURAL; INFILTRATION; INTRACAUDAL; PERINEURAL at 12:48

## 2017-06-13 RX ADMIN — SODIUM CHLORIDE, SODIUM LACTATE, POTASSIUM CHLORIDE, AND CALCIUM CHLORIDE 75 ML/HR: 600; 310; 30; 20 INJECTION, SOLUTION INTRAVENOUS at 12:41

## 2017-06-13 RX ADMIN — PROPOFOL 30 MG: 10 INJECTION, EMULSION INTRAVENOUS at 12:50

## 2017-06-13 NOTE — ANESTHESIA POSTPROCEDURE EVALUATION
Post-Anesthesia Evaluation and Assessment    Patient: Hussain Eaton. MRN: [id number]  SSN: [id number]    YOB: 1940  Age: 76 y.o. Sex: male      Data from PACU flowsheet    Cardiovascular Function/Vital Signs  Visit Vitals    /61    Pulse 80    Temp 36.8 °C (98.3 °F)    Resp 17    Ht 6' 2\" (1.88 m)    Wt 77.1 kg (170 lb)    SpO2 100%    BMI 21.53 kg/m2       Patient is status post anesthesia    Nausea/Vomiting: controlled    Postoperative hydration reviewed and adequate. Pain:  Managed    Neurological Status: At baseline    Mental Status and Level of Consciousness: Alert and oriented     Pulmonary Status:   Adequate oxygenation and airway patent    Complications related to anesthesia: None    Post-anesthesia assessment completed.  No concerns    Signed By: Sung Vu CRNA     June 13, 2017

## 2017-06-13 NOTE — DISCHARGE INSTRUCTIONS
Bronchoscopy: What to Expect at [facility]    Bronchoscopy lets your doctor look at your airway through a tube called a bronchoscope. Afterward, you may feel tired for 1 or 2 days. Your mouth may feel very dry for several hours after the procedure. You may also have a sore throat and a hoarse voice for a few days. Sucking on throat lozenges or gargling with warm salt water may help soothe your sore throat. If a sample of tissue (biopsy) was taken, you may spit up a small amount of blood or have bloody saliva. This is normal.  This care sheet gives you a general idea about how long it will take for you to recover. But each person recovers at a different pace. Follow the steps below to get better as quickly as possible. How can you care for yourself at home? Activity  · Do not eat anything for 2 hours after the procedure. · Rest when you feel tired. Getting enough sleep will help you recover. · Avoid strenuous activities, such as bicycle riding, jogging, weight lifting, or aerobic exercise, until your doctor says it is okay. · Ask your doctor when you can drive again. Diet  · You can eat your normal diet. If your stomach is upset, try bland, low-fat foods like plain rice, broiled chicken, toast, and yogurt. · If it is painful to swallow, start out with cold drinks, flavored ice pops, and ice cream. Next, try soft foods like pudding, yogurt, canned or cooked fruit, scrambled eggs, and mashed potatoes. Avoid eating hard or scratchy foods like chips or raw vegetables. Avoid orange or tomato juice and other acidic foods that can sting the throat. · Drink plenty of fluids to avoid becoming dehydrated (unless your doctor tells you not to). Medicines  · Take pain medicines exactly as directed. ¨ If the doctor gave you a prescription medicine for pain, take it as prescribed. ¨ If you are not taking a prescription pain medicine, ask your doctor if you can take an over-the-counter medicine.   · If you think your pain medicine is making you sick to your stomach:  ¨ Take your medicine after meals (unless your doctor has told you not to). ¨ Ask your doctor for a different pain medicine. · If your doctor prescribed antibiotics, take them as directed. Do not stop taking them just because you feel better. You need to take the full course of antibiotics. Follow-up care is a key part of your treatment and safety. Be sure to make and go to all appointments, and call your doctor if you are having problems. It's also a good idea to know your test results and keep a list of the medicines you take. When should you call for help? Call 911 anytime you think you may need emergency care. For example, call if:  · You passed out (lost consciousness). · You have sudden chest pain and shortness of breath. · You cough up large amounts of bright red blood. · You have severe pain in your chest.  · You have severe trouble breathing. Call your doctor now or seek immediate medical care if:  · You cough up more than a few tablespoons of blood. · You have pain that does not get better after you take pain medicine. · You have a fever over 100°F.  · You still sound hoarse after a few days. · You have bubbles under the skin around the collarbone. These may crackle and pop when you press on them. Watch closely for changes in your health, and be sure to contact your doctor if you have any problems. Where can you learn more? Go to http://miki-tavia.info/. Enter H301 in the search box to learn more about \"Bronchoscopy: What to Expect at Home. \"  Current as of: May 23, 2016  Content Version: 11.2  © 1179-2906 Healthwise, Incorporated. Care instructions adapted under license by [employer] (which disclaims liability or warranty for this information).  If you have questions about a medical condition or this instruction, always ask your healthcare professional. Rebeca Onofre any warranty or liability for your use of this information. DISCHARGE SUMMARY from Nurse    The following personal items are in your possession at time of discharge:    Dental Appliances: Uppers  Visual Aid: Glasses                            PATIENT INSTRUCTIONS:    After general anesthesia or intravenous sedation, for 24 hours or while taking prescription Narcotics:  · Limit your activities  · Do not drive and operate hazardous machinery  · Do not make important personal or business decisions  · Do  not drink alcoholic beverages  · If you have not urinated within 8 hours after discharge, please contact your surgeon on call. Report the following to your surgeon:  · Excessive pain, swelling, redness or odor of or around the surgical area  · Temperature over 100.5  · Nausea and vomiting lasting longer than 4 hours or if unable to take medications  · Any signs of decreased circulation or nerve impairment to extremity: change in color, persistent  numbness, tingling, coldness or increase pain  · Any questions        What to do at Home:        These are general instructions for a healthy lifestyle:    No smoking/ No tobacco products/ Avoid exposure to second hand smoke    Surgeon General's Warning:  Quitting smoking now greatly reduces serious risk to your health. Obesity, smoking, and sedentary lifestyle greatly increases your risk for illness    A healthy diet, regular physical exercise & weight monitoring are important for maintaining a healthy lifestyle    You may be retaining fluid if you have a history of heart failure or if you experience any of the following symptoms:  Weight gain of 3 pounds or more overnight or 5 pounds in a week, increased swelling in our hands or feet or shortness of breath while lying flat in bed. Please call your doctor as soon as you notice any of these symptoms; do not wait until your next office visit.     Recognize signs and symptoms of STROKE:    F-face looks uneven    A-arms unable to move or move unevenly    S-speech slurred or non-existent    T-time-call 911 as soon as signs and symptoms begin-DO NOT go       Back to bed or wait to see if you get better-TIME IS BRAIN. Warning Signs of HEART ATTACK     Call 911 if you have these symptoms:   Chest discomfort. Most heart attacks involve discomfort in the center of the chest that lasts more than a few minutes, or that goes away and comes back. It can feel like uncomfortable pressure, squeezing, fullness, or pain.  Discomfort in other areas of the upper body. Symptoms can include pain or discomfort in one or both arms, the back, neck, jaw, or stomach.  Shortness of breath with or without chest discomfort.  Other signs may include breaking out in a cold sweat, nausea, or lightheadedness. Don't wait more than five minutes to call 911 - MINUTES MATTER! Fast action can save your life. Calling 911 is almost always the fastest way to get lifesaving treatment. Emergency Medical Services staff can begin treatment when they arrive -- up to an hour sooner than if someone gets to the hospital by car. The discharge information has been reviewed with the patient. The patient verbalized understanding. Discharge medications reviewed with the patient and appropriate educational materials and side effects teaching were provided. Patient armband removed and given to patient to take home.   Patient was informed of the privacy risks if armband lost or stolen

## 2017-06-13 NOTE — IP AVS SNAPSHOT
Prasanth Calderon 
 
 
 [address] 
[phone] Patient: Abhi Campos. MRN: [id number] YEN:[id number] You are allergic to the following Allergen Reactions Chantix (Varenicline) Other (comments) Effexor (Venlafaxine) Other (comments) Recent Documentation Height Weight BMI Smoking Status 1.88 m 77.1 kg 21.53 kg/m2 Current Every Day Smoker Emergency Contacts Name Discharge Info Relation Home Work Mobile Karen Tamez CAREGIVER [4] Other Relative [6]   [phone] About your hospitalization You were admitted on:  June 13, 2017 You last received care in the:  [address] PHASE 2 RECOVERY You were discharged on:  June 13, 2017 Unit phone number:  [phone] Why you were hospitalized Your primary diagnosis was:  Mediastinal Mass Your diagnoses also included:  Breast Cancer (Hcc) Providers Seen During Your Hospitalizations Provider Role Specialty Primary office phone Iwona Dyson MD Attending Provider Pulmonary Disease [phone] Your Primary Care Physician (PCP) Primary Care Physician Office Phone Office Fax Delmy Salome [phone] Follow-up Information Follow up With Details Comments Contact Info Aruna Esquivel MD   [address] [place] [address] [phone] Current Discharge Medication List  
  
CONTINUE these medications which have NOT CHANGED Dose & Instructions Dispensing Information Comments Morning Noon Evening Bedtime  
 albuterol 90 mcg/actuation inhaler Commonly known as:  PROVENTIL HFA, VENTOLIN HFA, PROAIR HFA Your last dose was: Your next dose is: Take  by inhalation. Refills:  0  
     
   
   
   
  
 alendronate 35 mg tablet Commonly known as:  FOSAMAX Your last dose was: Your next dose is: Dose:  35 mg Take 35 mg by mouth every seven (7) days. Refills:  0  
     
   
   
   
  
 ALTACE 10 mg capsule Generic drug:  ramipril Your last dose was: Your next dose is:    
   
   
 Dose:  10 mg Take 10 mg by mouth daily. Refills:  0  
     
   
   
   
  
 apixaban 5 mg tablet Commonly known as:  Norma Eugenioisrael Your last dose was: Your next dose is: Take two tabs two times daily for 7 days and then 1 tab two times daily  Indications: deep venous thrombosis Quantity:  74 Tab Refills:  0  
     
   
   
   
  
 aspirin 81 mg chewable tablet Your last dose was: Your next dose is:    
   
   
 Dose:  81 mg Take 81 mg by mouth daily. Refills:  0  
     
   
   
   
  
 atorvastatin 80 mg tablet Commonly known as:  LIPITOR Your last dose was: Your next dose is:    
   
   
 Dose:  80 mg Take 80 mg by mouth daily. Refills:  0 LEVITRA 20 mg tablet Generic drug:  vardenafil Your last dose was: Your next dose is:    
   
   
 Dose:  5 mg Take 5 mg by mouth as needed. Refills:  0 LEXAPRO 20 mg tablet Generic drug:  escitalopram oxalate Your last dose was: Your next dose is:    
   
   
 Dose:  20 mg Take 20 mg by mouth daily. Refills:  0 MIRALAX 17 gram packet Generic drug:  polyethylene glycol Your last dose was: Your next dose is:    
   
   
 Dose:  17 g Take 17 g by mouth daily. Refills:  0  
     
   
   
   
  
 multivitamin tablet Commonly known as:  ONE A DAY Your last dose was: Your next dose is:    
   
   
 Dose:  1 Tab Take 1 Tab by mouth daily. Refills:  0  
     
   
   
   
  
 sildenafil citrate 100 mg tablet Commonly known as:  VIAGRA Your last dose was:     
   
Your next dose is:    
   
   
 Dose:  100 mg  
 Take 1 Tab by mouth daily as needed for 4 doses. Quantity:  5 Tab Refills:  12  
     
   
   
   
  
 tamoxifen 20 mg tablet Commonly known as:  NOLVADEX Your last dose was: Your next dose is:    
   
   
 Dose:  20 mg Take 20 mg by mouth daily. Refills:  0 Discharge Instructions Bronchoscopy: What to Expect at [facility] Your Recovery Bronchoscopy lets your doctor look at your airway through a tube called a bronchoscope. Afterward, you may feel tired for 1 or 2 days. Your mouth may feel very dry for several hours after the procedure. You may also have a sore throat and a hoarse voice for a few days. Sucking on throat lozenges or gargling with warm salt water may help soothe your sore throat. If a sample of tissue (biopsy) was taken, you may spit up a small amount of blood or have bloody saliva. This is normal. 
This care sheet gives you a general idea about how long it will take for you to recover. But each person recovers at a different pace. Follow the steps below to get better as quickly as possible. How can you care for yourself at home? Activity · Do not eat anything for 2 hours after the procedure. · Rest when you feel tired. Getting enough sleep will help you recover. · Avoid strenuous activities, such as bicycle riding, jogging, weight lifting, or aerobic exercise, until your doctor says it is okay. · Ask your doctor when you can drive again. Diet · You can eat your normal diet. If your stomach is upset, try bland, low-fat foods like plain rice, broiled chicken, toast, and yogurt. · If it is painful to swallow, start out with cold drinks, flavored ice pops, and ice cream. Next, try soft foods like pudding, yogurt, canned or cooked fruit, scrambled eggs, and mashed potatoes. Avoid eating hard or scratchy foods like chips or raw vegetables. Avoid orange or tomato juice and other acidic foods that can sting the throat. · Drink plenty of fluids to avoid becoming dehydrated (unless your doctor tells you not to). Medicines · Take pain medicines exactly as directed. ¨ If the doctor gave you a prescription medicine for pain, take it as prescribed. ¨ If you are not taking a prescription pain medicine, ask your doctor if you can take an over-the-counter medicine. · If you think your pain medicine is making you sick to your stomach: 
¨ Take your medicine after meals (unless your doctor has told you not to). ¨ Ask your doctor for a different pain medicine. · If your doctor prescribed antibiotics, take them as directed. Do not stop taking them just because you feel better. You need to take the full course of antibiotics. Follow-up care is a key part of your treatment and safety. Be sure to make and go to all appointments, and call your doctor if you are having problems. It's also a good idea to know your test results and keep a list of the medicines you take. When should you call for help? Call 911 anytime you think you may need emergency care. For example, call if: 
· You passed out (lost consciousness). · You have sudden chest pain and shortness of breath. · You cough up large amounts of bright red blood. · You have severe pain in your chest. 
· You have severe trouble breathing. Call your doctor now or seek immediate medical care if: 
· You cough up more than a few tablespoons of blood. · You have pain that does not get better after you take pain medicine. · You have a fever over 100°F. 
· You still sound hoarse after a few days. · You have bubbles under the skin around the collarbone. These may crackle and pop when you press on them. Watch closely for changes in your health, and be sure to contact your doctor if you have any problems. Where can you learn more? Go to http://miki-tavia.info/. Enter E126 in the search box to learn more about \"Bronchoscopy: What to Expect at Home. \" 
 Current as of: May 23, 2016 Content Version: 11.2 © 7791-4547 [employer]. Care instructions adapted under license by [employer] (which disclaims liability or warranty for this information). If you have questions about a medical condition or this instruction, always ask your healthcare professional. [address] any warranty or liability for your use of this information. DISCHARGE SUMMARY from Nurse The following personal items are in your possession at time of discharge: 
 
Dental Appliances: Uppers Visual Aid: Glasses PATIENT INSTRUCTIONS: 
 
After general anesthesia or intravenous sedation, for 24 hours or while taking prescription Narcotics: · Limit your activities · Do not drive and operate hazardous machinery · Do not make important personal or business decisions · Do  not drink alcoholic beverages · If you have not urinated within 8 hours after discharge, please contact your surgeon on call. Report the following to your surgeon: 
· Excessive pain, swelling, redness or odor of or around the surgical area · Temperature over 100.5 · Nausea and vomiting lasting longer than 4 hours or if unable to take medications · Any signs of decreased circulation or nerve impairment to extremity: change in color, persistent  numbness, tingling, coldness or increase pain · Any questions What to do at Home: These are general instructions for a healthy lifestyle: No smoking/ No tobacco products/ Avoid exposure to second hand smoke Surgeon General's Warning:  Quitting smoking now greatly reduces serious risk to your health. Obesity, smoking, and sedentary lifestyle greatly increases your risk for illness A healthy diet, regular physical exercise & weight monitoring are important for maintaining a healthy lifestyle You may be retaining fluid if you have a history of heart failure or if you experience any of the following symptoms:  Weight gain of 3 pounds or more overnight or 5 pounds in a week, increased swelling in our hands or feet or shortness of breath while lying flat in bed. Please call your doctor as soon as you notice any of these symptoms; do not wait until your next office visit. Recognize signs and symptoms of STROKE: 
 
F-face looks uneven A-arms unable to move or move unevenly S-speech slurred or non-existent T-time-call 911 as soon as signs and symptoms begin-DO NOT go Back to bed or wait to see if you get better-TIME IS BRAIN. Warning Signs of HEART ATTACK Call 911 if you have these symptoms: 
? Chest discomfort. Most heart attacks involve discomfort in the center of the chest that lasts more than a few minutes, or that goes away and comes back. It can feel like uncomfortable pressure, squeezing, fullness, or pain. ? Discomfort in other areas of the upper body. Symptoms can include pain or discomfort in one or both arms, the back, neck, jaw, or stomach. ? Shortness of breath with or without chest discomfort. ? Other signs may include breaking out in a cold sweat, nausea, or lightheadedness. Don't wait more than five minutes to call [address]! Fast action can save your life. Calling 911 is almost always the fastest way to get lifesaving treatment. Emergency Medical Services staff can begin treatment when they arrive  up to an hour sooner than if someone gets to the hospital by car. The discharge information has been reviewed with the patient. The patient verbalized understanding. Discharge medications reviewed with the patient and appropriate educational materials and side effects teaching were provided. Patient armband removed and given to patient to take home. Patient was informed of the privacy risks if armband lost or stolen Discharge Orders None Introducing [facility]! [facility] introduces BetaStud[employer] patient portal. Now you can access parts of your medical record, email your doctor's office, and request medication refills online. 1. In your internet browser, go to [email or web address] 2. Click on the First Time User? Click Here link in the Sign In box. You will see the New Member Sign Up page. 3. Enter your BetaStudios Access Code exactly as it appears below. You will not need to use this code after youve completed the sign-up process. If you do not sign up before the expiration date, you must request a new code. · Beta[employer] Access Code: [email or web address] Expires: 8/14/2017  1:23 PM 
 
4. Enter the last four digits of your Social Security Number (xxxx) and Date of Birth (mm/dd/yyyy) as indicated and click Submit. You will be taken to the next sign-up page. 5. Create a BetaStud[employer] ID. This will be your BetaStud[employer] login ID and cannot be changed, so think of one that is secure and easy to remember. 6. Create a BetaStud[employer] password. You can change your password at any time. 7. Enter your Password Reset Question and Answer. This can be used at a later time if you forget your password. 8. Enter your e-mail address. You will receive e-mail notification when new information is available in [facility] 9. Click Sign Up. You can now view and download portions of your medical record. 10. Click the Download Summary menu link to download a portable copy of your medical information. If you have questions, please visit the Frequently Asked Questions section of the BetaStud[employer] website. Remember, BetaStud[employer] is NOT to be used for urgent needs. For medical emergencies, dial 911. Now available from your iPhone and Android! General Information Please provide this summary of care documentation to your next provider. Patient Signature:  ____________________________________________________________ Date:  ____________________________________________________________  
  
Theone Schaffer Provider Signature:  ____________________________________________________________ Date:  ____________________________________________________________

## 2017-06-13 NOTE — ANESTHESIA PREPROCEDURE EVALUATION
Anesthetic History   No history of anesthetic complications            Review of Systems / Medical History  Patient summary reviewed and pertinent labs reviewed    Pulmonary    COPD: moderate               Neuro/Psych   Within defined limits           Cardiovascular    Hypertension          CAD    Exercise tolerance: >4 METS  Comments: S/p CABG 3v 2012   GI/Hepatic/Renal  Within defined limits              Endo/Other  Within defined limits           Other Findings   Comments:   Risk Factors for Postoperative nausea/vomiting:       History of postoperative nausea/vomiting? NO       Female? NO       Motion sickness? NO       Intended opioid administration for postoperative analgesia? NO      Smoking Abstinence  Current Smoker? YES  Elective Surgery? YES  Seen preoperatively by anesthesiologist or proxy prior to day of surgery? YES  Pt abstained from smoking 24 hours prior to anesthesia?  NO           Physical Exam    Airway  Mallampati: II  TM Distance: 4 - 6 cm  Neck ROM: normal range of motion   Mouth opening: Normal     Cardiovascular  Regular rate and rhythm,  S1 and S2 normal,  no murmur, click, rub, or gallop  Rhythm: regular  Rate: normal         Dental    Dentition: Upper dentition intact and Full upper dentures     Pulmonary  Breath sounds clear to auscultation               Abdominal  GI exam deferred       Other Findings            Anesthetic Plan    ASA: 3  Anesthesia type: general          Induction: Intravenous  Anesthetic plan and risks discussed with: Patient

## 2017-06-13 NOTE — PERIOP NOTES
1333:  Patient received from ENDO. Attached to monitors. VSS. Patient drowsy. Will continue to monitor.

## 2017-06-13 NOTE — PROCEDURES
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 EBL: Minimal/ hemostasis complete. H+P signed, charted and updated.   Dictated: [id number]    Therese Garcia MD    6/13/2017   1:24 PM

## 2017-06-13 NOTE — H&P
Date of Surgery Update:  Claudia Cristal. was seen and examined. History and physical has been reviewed. The patient has been examined.  There have been no significant clinical changes since the completion of the originally dated History and Physical.    Signed By: Sebastian Cancino MD     June 13, 2017 7:46 AM

## 2017-06-13 NOTE — PROCEDURES
[address]  PROCEDURES    Name:  Ana Maria Obrien  MR#:  [id number]  :  1940  Account #:  [de-identified]  Date of Adm:  2017  Date of Procedure:  2017      REFERRING PHYSICIAN: Arian Mccarthy MD    PREOPERATIVE DIAGNOSES  1. The patient is known to have breast cancer. 2. He presented with SVC syndrome. 3. He had a right paratracheal mass. 4. Rule out metastatic versus primary lung cancer. POSTOPERATIVE DIAGNOSIS: Normal lower trachea (patient was  done under endotracheal intubation, mechanical ventilation so the  upper trachea larynx could not be observed). The chrissy was sharp. The left main bronchial system was within normal limits. The right  EB tumor at the level of the right upper lobe entry at the level of the  bifurcation between the right upper lobe and the truncus intermedius  extending into the truncus intermedius. PROCEDURE PERFORMED: Bronchoscopy. ANESTHESIA:  Premedications and anesthesia were provided by  Anesthesia MD and by CRNA. For details of the medications used,  please refer to their notes as they were both present during the  procedure. ENDOSCOPE USED: Olympus [id number]. SPECIMENS REMOVED  1. Endobronchial biopsies, right upper lobe area, x10.  2. Lavage times several.  3. Cytology brushings x4 in the right upper lobe area for a total of 8  slides. 4. TBNA at ATS station 4R. These were done x3. ESTIMATED BLOOD LOSS: Minimal/hemostasis complete. COMPLICATIONS: None. DESCRIPTION OF PROCEDURE: After signing his informed consent  for anesthesia and bronchoscopy, the patient was brought to the  endoscopy suite and he was sedated and intubated translaryngeally by  anesthesia. After this, we applied a swivel valve to his ET tube and  through this we passed into his airway. The lower trachea was found to  be within normal limits. The chrissy was sharp and mobile. We first  inspected the left main stem bronchus.  Sequentially inspected left main  stem bronchus, LC2, left upper lobe superior and inferior region, left  lower lobe including LB6, finding no endobronchial lesions. Returned to  the chrissy, entered the right main stem bronchus. Sequentially  inspected ramus and bronchus, right upper lobe, truncus intermedius,  right middle lobe, right lower lobe including RB6 finding endobronchial  tumor located at the bifurcation between the right upper lobe opening  and the truncus intermedius. See photograph #3, which is a photograph  aided by NBI which exceeds clearly \"tumor vessels\" on the surface of  the endobronchial lesions. Under direct visualization, obtained  bronchoalveolar lavage followed by cytology brushings x4 for a total of  8 slides, and followed by endobronchial biopsy x10. Minimal bleeding  was noted. Hemostasis complete, lavage several times. Returned to  the chrissy at this time and under direct visualization and fluoroscopic  control, passed an [employer] needle 21 gauge at the 4R ATS station x3. Only the first pass seems to be adequate, containing white tissue  material mixed with minimal amount of blood. Second and third passes  seen mostly bloody. Hemostasis was complete by the time the  endoscopy was finished. The patient tolerated the procedure well. He  was monitored with EKG, heart rate, pulse oximeter, blood pressure  and end tidal CO2 per ASA guidelines. He was taken to the PACU and  will share my findings with his family members. Appreciate very much from Dr. Maryuri Nguyen allowing us to participate in  his care. The patient will be discharged once he is cleared by  Anesthesia from the PACU.         Km Robbins MD    CS / MAXIMO  D:  06/13/2017   13:35  T:  06/13/2017   14:15  Job #:  [id number]

## 2017-06-15 LAB
BACTERIA SPEC CULT: NORMAL
GRAM STN SPEC: NORMAL
SERVICE CMNT-IMP: NORMAL

## (undated) DEVICE — NDL BX EXCELON 21GX130CM --

## (undated) DEVICE — SYRINGE MED 10ML POLYPR LUERSLIP TIP FLAT TOP W/O SFTY DISP

## (undated) DEVICE — SOL IRRIGATION INJ NACL 0.9% 500ML BTL

## (undated) DEVICE — FCPS BIOP PULM RAD JAW 100CML -- BX/10 M00515180

## (undated) DEVICE — MEDI-VAC NON-CONDUCTIVE SUCTION TUBING: Brand: CARDINAL HEALTH

## (undated) DEVICE — SET ADMIN 16ML TBNG L100IN 2 Y INJ SITE IV PIGGY BK DISP

## (undated) DEVICE — (D)STOPCOCK IV 4W STD BOR -- DISC BY MFR NO SUB

## (undated) DEVICE — SINGLE USE SUCTION VALVE MAJ-209: Brand: SINGLE USE SUCTION VALVE (STERILE)

## (undated) DEVICE — BRUSH CYTO L150CM CHN L2MM BRIST DIA1.9MM PTFE S STL BULL

## (undated) DEVICE — 6X9 1.75 MIL 3-W LABGUARD TZ,DISPENS.BOX: Brand: MINIGRIP COMMERCIAL

## (undated) DEVICE — ADAPTER TBNG DIA15MM SWVL FBROPT BRONCHSCP TERM 2 AXIS PEEP

## (undated) DEVICE — ARNDT ENDOBRONCHIAL BLOCKER SET WITH SPHERICAL BALLOON: Brand: ARNDT

## (undated) DEVICE — KENDALL 500 SERIES DIAPHORETIC FOAM MONITORING ELECTRODE - TEAR DROP SHAPE ( 30/PK): Brand: KENDALL

## (undated) DEVICE — SYR ASSEMB INFL BLLN 60ML --

## (undated) DEVICE — CANNULA ORIG TL CLR W FOAM CUSHIONS AND 14FT SUPL TB 3 CHN

## (undated) DEVICE — KIT COLON W/ 1.1OZ LUB AND 2 END

## (undated) DEVICE — TRAP MUCUS SPECIMEN 40ML -- MEDICHOICE

## (undated) DEVICE — FLEX ADVANTAGE 1500CC: Brand: FLEX ADVANTAGE

## (undated) DEVICE — SYR 50ML SLIP TIP NSAF LF STRL --

## (undated) DEVICE — SINGLE USE BIOPSY VALVE MAJ-210: Brand: SINGLE USE BIOPSY VALVE (STERILE)

## (undated) DEVICE — BITE BLK ENDOSCP AD 54FR GRN POLYETH ENDOSCP W STRP SLD